# Patient Record
Sex: MALE | Race: WHITE | NOT HISPANIC OR LATINO | Employment: OTHER | URBAN - METROPOLITAN AREA
[De-identification: names, ages, dates, MRNs, and addresses within clinical notes are randomized per-mention and may not be internally consistent; named-entity substitution may affect disease eponyms.]

---

## 2021-01-28 ENCOUNTER — PREP FOR SURGERY (OUTPATIENT)
Dept: OTHER | Facility: HOSPITAL | Age: 81
End: 2021-01-28

## 2021-01-28 ENCOUNTER — TELEPHONE (OUTPATIENT)
Dept: CARDIAC SURGERY | Facility: CLINIC | Age: 81
End: 2021-01-28

## 2021-01-28 ENCOUNTER — HOSPITAL ENCOUNTER (OUTPATIENT)
Facility: HOSPITAL | Age: 81
Setting detail: SURGERY ADMIT
End: 2021-01-28
Attending: THORACIC SURGERY (CARDIOTHORACIC VASCULAR SURGERY) | Admitting: THORACIC SURGERY (CARDIOTHORACIC VASCULAR SURGERY)

## 2021-01-28 ENCOUNTER — APPOINTMENT (OUTPATIENT)
Dept: GENERAL RADIOLOGY | Facility: HOSPITAL | Age: 81
End: 2021-01-28

## 2021-01-28 ENCOUNTER — HOSPITAL ENCOUNTER (INPATIENT)
Facility: HOSPITAL | Age: 81
LOS: 7 days | Discharge: HOME | End: 2021-02-04
Attending: THORACIC SURGERY (CARDIOTHORACIC VASCULAR SURGERY) | Admitting: THORACIC SURGERY (CARDIOTHORACIC VASCULAR SURGERY)

## 2021-01-28 DIAGNOSIS — I25.118 CORONARY ARTERY DISEASE OF NATIVE ARTERY OF NATIVE HEART WITH STABLE ANGINA PECTORIS (HCC): Primary | ICD-10-CM

## 2021-01-28 DIAGNOSIS — I25.118 CORONARY ARTERY DISEASE OF NATIVE HEART WITH STABLE ANGINA PECTORIS, UNSPECIFIED VESSEL OR LESION TYPE (HCC): Primary | ICD-10-CM

## 2021-01-28 LAB
ARTERIAL PATENCY WRIST A: POSITIVE
ATMOSPHERIC PRESS: 761.4 MMHG
BASE EXCESS BLDA CALC-SCNC: -5.5 MMOL/L (ref 0–2)
BDY SITE: ABNORMAL
HCO3 BLDA-SCNC: 20.1 MMOL/L (ref 22–28)
INHALED O2 CONCENTRATION: 21 %
MODALITY: ABNORMAL
O2 A-A PPRESDIFF RESPIRATORY: 1 MMHG
PCO2 BLDA: 38.9 MM HG (ref 35–45)
PH BLDA: 7.32 PH UNITS (ref 7.35–7.45)
PO2 BLDA: 116.2 MM HG (ref 80–100)
SAO2 % BLDCOA: 98.2 % (ref 92–99)
TOTAL RATE: 18 BREATHS/MINUTE

## 2021-01-28 PROCEDURE — 86850 RBC ANTIBODY SCREEN: CPT | Performed by: NURSE PRACTITIONER

## 2021-01-28 PROCEDURE — 80061 LIPID PANEL: CPT | Performed by: NURSE PRACTITIONER

## 2021-01-28 PROCEDURE — 83036 HEMOGLOBIN GLYCOSYLATED A1C: CPT | Performed by: NURSE PRACTITIONER

## 2021-01-28 PROCEDURE — 86920 COMPATIBILITY TEST SPIN: CPT

## 2021-01-28 PROCEDURE — 86900 BLOOD TYPING SEROLOGIC ABO: CPT | Performed by: NURSE PRACTITIONER

## 2021-01-28 PROCEDURE — 85610 PROTHROMBIN TIME: CPT | Performed by: NURSE PRACTITIONER

## 2021-01-28 PROCEDURE — 86923 COMPATIBILITY TEST ELECTRIC: CPT

## 2021-01-28 PROCEDURE — 83735 ASSAY OF MAGNESIUM: CPT | Performed by: NURSE PRACTITIONER

## 2021-01-28 PROCEDURE — 80053 COMPREHEN METABOLIC PANEL: CPT | Performed by: NURSE PRACTITIONER

## 2021-01-28 PROCEDURE — 85730 THROMBOPLASTIN TIME PARTIAL: CPT | Performed by: NURSE PRACTITIONER

## 2021-01-28 PROCEDURE — 82803 BLOOD GASES ANY COMBINATION: CPT

## 2021-01-28 PROCEDURE — 36600 WITHDRAWAL OF ARTERIAL BLOOD: CPT

## 2021-01-28 PROCEDURE — 83880 ASSAY OF NATRIURETIC PEPTIDE: CPT | Performed by: NURSE PRACTITIONER

## 2021-01-28 PROCEDURE — 86901 BLOOD TYPING SEROLOGIC RH(D): CPT | Performed by: NURSE PRACTITIONER

## 2021-01-28 PROCEDURE — 71046 X-RAY EXAM CHEST 2 VIEWS: CPT

## 2021-01-28 RX ORDER — SODIUM CHLORIDE 0.9 % (FLUSH) 0.9 %
3 SYRINGE (ML) INJECTION EVERY 12 HOURS SCHEDULED
Status: DISCONTINUED | OUTPATIENT
Start: 2021-01-28 | End: 2021-01-30

## 2021-01-28 RX ORDER — SODIUM CHLORIDE 0.9 % (FLUSH) 0.9 %
10 SYRINGE (ML) INJECTION AS NEEDED
Status: DISCONTINUED | OUTPATIENT
Start: 2021-01-28 | End: 2021-01-30

## 2021-01-28 RX ORDER — CETIRIZINE HYDROCHLORIDE 5 MG/1
10 TABLET ORAL DAILY
COMMUNITY

## 2021-01-28 RX ORDER — TAMSULOSIN HYDROCHLORIDE 0.4 MG/1
1 CAPSULE ORAL DAILY
COMMUNITY

## 2021-01-28 RX ORDER — ACYCLOVIR 400 MG/1
400 TABLET ORAL 2 TIMES DAILY
COMMUNITY

## 2021-01-28 RX ORDER — CHLORHEXIDINE GLUCONATE 0.12 MG/ML
15 RINSE ORAL EVERY 12 HOURS SCHEDULED
Status: DISCONTINUED | OUTPATIENT
Start: 2021-01-28 | End: 2021-01-29

## 2021-01-28 RX ORDER — ASPIRIN 81 MG/1
81 TABLET, CHEWABLE ORAL DAILY
COMMUNITY
End: 2021-02-04 | Stop reason: HOSPADM

## 2021-01-28 RX ORDER — CEFAZOLIN SODIUM 2 G/100ML
2 INJECTION, SOLUTION INTRAVENOUS
Status: ACTIVE | OUTPATIENT
Start: 2021-01-29 | End: 2021-01-30

## 2021-01-28 RX ORDER — CHLORHEXIDINE GLUCONATE 500 MG/1
1 CLOTH TOPICAL EVERY 12 HOURS
Status: DISCONTINUED | OUTPATIENT
Start: 2021-01-28 | End: 2021-01-29

## 2021-01-28 RX ORDER — ERYTHROMYCIN 5 MG/G
1 OINTMENT OPHTHALMIC EVERY 6 HOURS
COMMUNITY

## 2021-01-28 RX ORDER — CHOLECALCIFEROL (VITAMIN D3) 125 MCG
500 CAPSULE ORAL DAILY
COMMUNITY

## 2021-01-28 RX ORDER — AMLODIPINE BESYLATE 5 MG/1
5 TABLET ORAL DAILY
Status: ON HOLD | COMMUNITY
End: 2021-01-29

## 2021-01-28 RX ORDER — HEPARIN SODIUM 10000 [USP'U]/100ML
500 INJECTION, SOLUTION INTRAVENOUS CONTINUOUS
Status: DISCONTINUED | OUTPATIENT
Start: 2021-01-29 | End: 2021-01-30

## 2021-01-28 RX ADMIN — SODIUM CHLORIDE, PRESERVATIVE FREE 3 ML: 5 INJECTION INTRAVENOUS at 23:48

## 2021-01-28 RX ADMIN — CHLORHEXIDINE GLUCONATE 1 APPLICATION: 500 CLOTH TOPICAL at 23:48

## 2021-01-29 ENCOUNTER — APPOINTMENT (OUTPATIENT)
Dept: CARDIOLOGY | Facility: HOSPITAL | Age: 81
End: 2021-01-29

## 2021-01-29 PROBLEM — I25.10 CAD (CORONARY ARTERY DISEASE): Status: ACTIVE | Noted: 2021-01-29

## 2021-01-29 LAB
ABO GROUP BLD: NORMAL
ACT BLD: 125 SECONDS (ref 82–152)
ACT BLD: 131 SECONDS (ref 82–152)
ACT BLD: 263 SECONDS (ref 82–152)
ALBUMIN SERPL-MCNC: 3.2 G/DL (ref 3.5–5.2)
ALBUMIN/GLOB SERPL: 0.5 G/DL
ALP SERPL-CCNC: 53 U/L (ref 39–117)
ALT SERPL W P-5'-P-CCNC: 18 U/L (ref 1–41)
ANION GAP SERPL CALCULATED.3IONS-SCNC: 11.5 MMOL/L (ref 5–15)
APTT PPP: 27 SECONDS (ref 22.7–35.4)
APTT PPP: <20 SECONDS (ref 22.7–35.4)
AST SERPL-CCNC: 19 U/L (ref 1–40)
BH CV XLRA MEAS - DIST GSV CALF DIST LEFT: 0.1 CM
BH CV XLRA MEAS - DIST GSV CALF DIST RIGHT: 0.33 CM
BH CV XLRA MEAS - DIST GSV THIGH DIST LEFT: 0.3 CM
BH CV XLRA MEAS - DIST GSV THIGH DIST RIGHT: 0.39 CM
BH CV XLRA MEAS - GSV ANKLE DIST LEFT: 0.16 CM
BH CV XLRA MEAS - GSV ANKLE DIST RIGHT: 0.29 CM
BH CV XLRA MEAS - GSV KNEE DIST LEFT: 0.25 CM
BH CV XLRA MEAS - GSV KNEE DIST RIGHT: 0.36 CM
BH CV XLRA MEAS - GSV ORIGIN DIST LEFT: 1.4 CM
BH CV XLRA MEAS - GSV ORIGIN DIST RIGHT: 0.81 CM
BH CV XLRA MEAS - MID GSV CALF LEFT: 0.14 CM
BH CV XLRA MEAS - MID GSV CALF RIGHT: 0.37 CM
BH CV XLRA MEAS - MID GSV THIGH  LEFT: 0.29 CM
BH CV XLRA MEAS - MID GSV THIGH  RIGHT: 0.41 CM
BH CV XLRA MEAS - PROX GSV CALF DIST LEFT: 0.24 CM
BH CV XLRA MEAS - PROX GSV CALF DIST RIGHT: 0.33 CM
BH CV XLRA MEAS - PROX GSV THIGH  LEFT: 0.28 CM
BH CV XLRA MEAS - PROX GSV THIGH  RIGHT: 0.38 CM
BH CV XLRA MEAS LEFT CCA RATIO VEL: -96.7 CM/SEC
BH CV XLRA MEAS LEFT DIST CCA EDV: -15.8 CM/SEC
BH CV XLRA MEAS LEFT DIST CCA PSV: -96.7 CM/SEC
BH CV XLRA MEAS LEFT DIST ICA EDV: -27.8 CM/SEC
BH CV XLRA MEAS LEFT DIST ICA PSV: -81.6 CM/SEC
BH CV XLRA MEAS LEFT ICA RATIO VEL: 112 CM/SEC
BH CV XLRA MEAS LEFT ICA/CCA RATIO: -1.2
BH CV XLRA MEAS LEFT MID ICA EDV: -23.2 CM/SEC
BH CV XLRA MEAS LEFT MID ICA PSV: -62.9 CM/SEC
BH CV XLRA MEAS LEFT PROX CCA EDV: -20.5 CM/SEC
BH CV XLRA MEAS LEFT PROX CCA PSV: -116 CM/SEC
BH CV XLRA MEAS LEFT PROX ECA EDV: 12.8 CM/SEC
BH CV XLRA MEAS LEFT PROX ECA PSV: 118 CM/SEC
BH CV XLRA MEAS LEFT PROX ICA EDV: 22 CM/SEC
BH CV XLRA MEAS LEFT PROX ICA PSV: 112 CM/SEC
BH CV XLRA MEAS LEFT PROX SCLA PSV: 238 CM/SEC
BH CV XLRA MEAS LEFT VERTEBRAL A EDV: -21.1 CM/SEC
BH CV XLRA MEAS LEFT VERTEBRAL A PSV: -78 CM/SEC
BH CV XLRA MEAS RIGHT CCA RATIO VEL: -99.8 CM/SEC
BH CV XLRA MEAS RIGHT DIST CCA EDV: -17.3 CM/SEC
BH CV XLRA MEAS RIGHT DIST CCA PSV: -99.8 CM/SEC
BH CV XLRA MEAS RIGHT DIST ICA EDV: -24.6 CM/SEC
BH CV XLRA MEAS RIGHT DIST ICA PSV: -65.5 CM/SEC
BH CV XLRA MEAS RIGHT ICA RATIO VEL: -135 CM/SEC
BH CV XLRA MEAS RIGHT ICA/CCA RATIO: 1.4
BH CV XLRA MEAS RIGHT MID ICA EDV: -31.4 CM/SEC
BH CV XLRA MEAS RIGHT MID ICA PSV: -102 CM/SEC
BH CV XLRA MEAS RIGHT PROX CCA EDV: 25.1 CM/SEC
BH CV XLRA MEAS RIGHT PROX CCA PSV: 140 CM/SEC
BH CV XLRA MEAS RIGHT PROX ECA EDV: -12.3 CM/SEC
BH CV XLRA MEAS RIGHT PROX ECA PSV: -176 CM/SEC
BH CV XLRA MEAS RIGHT PROX ICA EDV: -20.6 CM/SEC
BH CV XLRA MEAS RIGHT PROX ICA PSV: -135 CM/SEC
BH CV XLRA MEAS RIGHT PROX SCLA PSV: 165 CM/SEC
BH CV XLRA MEAS RIGHT VERTEBRAL A EDV: -9.5 CM/SEC
BH CV XLRA MEAS RIGHT VERTEBRAL A PSV: -25.7 CM/SEC
BILIRUB SERPL-MCNC: 0.3 MG/DL (ref 0–1.2)
BILIRUB UR QL STRIP: NEGATIVE
BLD GP AB SCN SERPL QL: NEGATIVE
BUN SERPL-MCNC: 33 MG/DL (ref 8–23)
BUN/CREAT SERPL: 20.6 (ref 7–25)
CALCIUM SPEC-SCNC: 8.6 MG/DL (ref 8.6–10.5)
CHLORIDE SERPL-SCNC: 104 MMOL/L (ref 98–107)
CHOLEST SERPL-MCNC: 141 MG/DL (ref 0–200)
CLARITY UR: CLEAR
CLOSE TME COLL+ADP + EPINEP PNL BLD: 41 %
CO2 SERPL-SCNC: 24.5 MMOL/L (ref 22–29)
COLOR UR: YELLOW
CREAT SERPL-MCNC: 1.6 MG/DL (ref 0.76–1.27)
DEPRECATED RDW RBC AUTO: 58.1 FL (ref 37–54)
ERYTHROCYTE [DISTWIDTH] IN BLOOD BY AUTOMATED COUNT: 17.1 % (ref 12.3–15.4)
FERRITIN SERPL-MCNC: 157 NG/ML (ref 30–400)
FOLATE SERPL-MCNC: 10.4 NG/ML (ref 4.78–24.2)
GFR SERPL CREATININE-BSD FRML MDRD: 42 ML/MIN/1.73
GLOBULIN UR ELPH-MCNC: 6.2 GM/DL
GLUCOSE BLDC GLUCOMTR-MCNC: 104 MG/DL (ref 70–130)
GLUCOSE BLDC GLUCOMTR-MCNC: 372 MG/DL (ref 70–130)
GLUCOSE BLDC GLUCOMTR-MCNC: 98 MG/DL (ref 70–130)
GLUCOSE SERPL-MCNC: 99 MG/DL (ref 65–99)
GLUCOSE UR STRIP-MCNC: NEGATIVE MG/DL
HBA1C MFR BLD: 6.3 % (ref 4.8–5.6)
HCT VFR BLD AUTO: 27.3 % (ref 37.5–51)
HDLC SERPL-MCNC: 35 MG/DL (ref 40–60)
HGB BLD-MCNC: 8.9 G/DL (ref 13–17.7)
HGB RETIC QN AUTO: 34.3 PG (ref 29.8–36.1)
HGB UR QL STRIP.AUTO: NEGATIVE
IMM RETICS NFR: 34.6 % (ref 3–15.8)
INR PPP: 1.08 (ref 0.9–1.1)
IRON 24H UR-MRATE: 49 MCG/DL (ref 59–158)
IRON SATN MFR SERPL: 20 % (ref 20–50)
KETONES UR QL STRIP: NEGATIVE
LDLC SERPL CALC-MCNC: 67 MG/DL (ref 0–100)
LDLC/HDLC SERPL: 1.66 {RATIO}
LEUKOCYTE ESTERASE UR QL STRIP.AUTO: NEGATIVE
LYMPHOCYTES # BLD MANUAL: 1.69 10*3/MM3 (ref 0.7–3.1)
LYMPHOCYTES NFR BLD MANUAL: 10 % (ref 5–12)
LYMPHOCYTES NFR BLD MANUAL: 18 % (ref 19.6–45.3)
MAGNESIUM SERPL-MCNC: 2.2 MG/DL (ref 1.6–2.4)
MCH RBC QN AUTO: 30.6 PG (ref 26.6–33)
MCHC RBC AUTO-ENTMCNC: 32.6 G/DL (ref 31.5–35.7)
MCV RBC AUTO: 93.8 FL (ref 79–97)
MONOCYTES # BLD AUTO: 0.94 10*3/MM3 (ref 0.1–0.9)
NEUTROPHILS # BLD AUTO: 6.77 10*3/MM3 (ref 1.7–7)
NEUTROPHILS NFR BLD MANUAL: 72 % (ref 42.7–76)
NITRITE UR QL STRIP: NEGATIVE
NRBC BLD AUTO-RTO: 2.8 /100 WBC (ref 0–0.2)
NRBC SPEC MANUAL: 6 /100 WBC (ref 0–0.2)
NT-PROBNP SERPL-MCNC: 1571 PG/ML (ref 0–1800)
PH UR STRIP.AUTO: 5.5 [PH] (ref 5–8)
PLAT MORPH BLD: NORMAL
PLATELET # BLD AUTO: 174 10*3/MM3 (ref 140–450)
PMV BLD AUTO: 11.1 FL (ref 6–12)
POTASSIUM SERPL-SCNC: 4 MMOL/L (ref 3.5–5.2)
PROT SERPL-MCNC: 9.4 G/DL (ref 6–8.5)
PROT UR QL STRIP: NEGATIVE
PROTHROMBIN TIME: 13.8 SECONDS (ref 11.7–14.2)
RBC # BLD AUTO: 2.91 10*6/MM3 (ref 4.14–5.8)
RBC MORPH BLD: NORMAL
RETICS # AUTO: 0.06 10*6/MM3 (ref 0.02–0.13)
RETICS/RBC NFR AUTO: 1.99 % (ref 0.7–1.9)
RH BLD: POSITIVE
RIGHT ARM BP: NORMAL MMHG
SARS-COV-2 ORF1AB RESP QL NAA+PROBE: NOT DETECTED
SODIUM SERPL-SCNC: 140 MMOL/L (ref 136–145)
SP GR UR STRIP: 1.02 (ref 1–1.03)
T&S EXPIRATION DATE: NORMAL
TIBC SERPL-MCNC: 244 MCG/DL (ref 298–536)
TRANSFERRIN SERPL-MCNC: 164 MG/DL (ref 200–360)
TRIGL SERPL-MCNC: 240 MG/DL (ref 0–150)
UROBILINOGEN UR QL STRIP: NORMAL
VIT B12 BLD-MCNC: 1016 PG/ML (ref 211–946)
VLDLC SERPL-MCNC: 39 MG/DL (ref 5–40)
WBC # BLD AUTO: 9.4 10*3/MM3 (ref 3.4–10.8)
WBC MORPH BLD: NORMAL

## 2021-01-29 PROCEDURE — 25010000002 MIDAZOLAM PER 1 MG: Performed by: INTERNAL MEDICINE

## 2021-01-29 PROCEDURE — 93880 EXTRACRANIAL BILAT STUDY: CPT

## 2021-01-29 PROCEDURE — 99153 MOD SED SAME PHYS/QHP EA: CPT | Performed by: INTERNAL MEDICINE

## 2021-01-29 PROCEDURE — C1725 CATH, TRANSLUMIN NON-LASER: HCPCS | Performed by: INTERNAL MEDICINE

## 2021-01-29 PROCEDURE — 85347 COAGULATION TIME ACTIVATED: CPT

## 2021-01-29 PROCEDURE — 25010000002 PHENYLEPHRINE PER 1 ML: Performed by: INTERNAL MEDICINE

## 2021-01-29 PROCEDURE — 0 IOPAMIDOL PER 1 ML: Performed by: INTERNAL MEDICINE

## 2021-01-29 PROCEDURE — 92920 PRQ TRLUML C ANGIOP 1ART&/BR: CPT | Performed by: INTERNAL MEDICINE

## 2021-01-29 PROCEDURE — 93970 EXTREMITY STUDY: CPT

## 2021-01-29 PROCEDURE — C1769 GUIDE WIRE: HCPCS | Performed by: INTERNAL MEDICINE

## 2021-01-29 PROCEDURE — 99222 1ST HOSP IP/OBS MODERATE 55: CPT | Performed by: INTERNAL MEDICINE

## 2021-01-29 PROCEDURE — 84466 ASSAY OF TRANSFERRIN: CPT | Performed by: INTERNAL MEDICINE

## 2021-01-29 PROCEDURE — C1887 CATHETER, GUIDING: HCPCS | Performed by: INTERNAL MEDICINE

## 2021-01-29 PROCEDURE — 86334 IMMUNOFIX E-PHORESIS SERUM: CPT | Performed by: INTERNAL MEDICINE

## 2021-01-29 PROCEDURE — 25010000002 FENTANYL CITRATE (PF) 100 MCG/2ML SOLUTION: Performed by: INTERNAL MEDICINE

## 2021-01-29 PROCEDURE — 82607 VITAMIN B-12: CPT | Performed by: INTERNAL MEDICINE

## 2021-01-29 PROCEDURE — 25010000002 HEPARIN (PORCINE) PER 1000 UNITS: Performed by: THORACIC SURGERY (CARDIOTHORACIC VASCULAR SURGERY)

## 2021-01-29 PROCEDURE — 0270356 DILATION OF CORONARY ARTERY, ONE ARTERY, BIFURCATION, WITH TWO DRUG-ELUTING INTRALUMINAL DEVICES, PERCUTANEOUS APPROACH: ICD-10-PCS | Performed by: INTERNAL MEDICINE

## 2021-01-29 PROCEDURE — 82746 ASSAY OF FOLIC ACID SERUM: CPT | Performed by: INTERNAL MEDICINE

## 2021-01-29 PROCEDURE — 85025 COMPLETE CBC W/AUTO DIFF WBC: CPT | Performed by: NURSE PRACTITIONER

## 2021-01-29 PROCEDURE — 86900 BLOOD TYPING SEROLOGIC ABO: CPT

## 2021-01-29 PROCEDURE — C1874 STENT, COATED/COV W/DEL SYS: HCPCS | Performed by: INTERNAL MEDICINE

## 2021-01-29 PROCEDURE — 85730 THROMBOPLASTIN TIME PARTIAL: CPT | Performed by: THORACIC SURGERY (CARDIOTHORACIC VASCULAR SURGERY)

## 2021-01-29 PROCEDURE — 25010000002 PROTAMINE SULFATE PER 10 MG: Performed by: INTERNAL MEDICINE

## 2021-01-29 PROCEDURE — 83540 ASSAY OF IRON: CPT | Performed by: INTERNAL MEDICINE

## 2021-01-29 PROCEDURE — 84165 PROTEIN E-PHORESIS SERUM: CPT | Performed by: INTERNAL MEDICINE

## 2021-01-29 PROCEDURE — 92928 PRQ TCAT PLMT NTRAC ST 1 LES: CPT | Performed by: INTERNAL MEDICINE

## 2021-01-29 PROCEDURE — C1894 INTRO/SHEATH, NON-LASER: HCPCS | Performed by: INTERNAL MEDICINE

## 2021-01-29 PROCEDURE — 85007 BL SMEAR W/DIFF WBC COUNT: CPT | Performed by: NURSE PRACTITIONER

## 2021-01-29 PROCEDURE — 84155 ASSAY OF PROTEIN SERUM: CPT | Performed by: INTERNAL MEDICINE

## 2021-01-29 PROCEDURE — 82784 ASSAY IGA/IGD/IGG/IGM EACH: CPT | Performed by: INTERNAL MEDICINE

## 2021-01-29 PROCEDURE — 82962 GLUCOSE BLOOD TEST: CPT

## 2021-01-29 PROCEDURE — 85576 BLOOD PLATELET AGGREGATION: CPT | Performed by: NURSE PRACTITIONER

## 2021-01-29 PROCEDURE — 83883 ASSAY NEPHELOMETRY NOT SPEC: CPT | Performed by: INTERNAL MEDICINE

## 2021-01-29 PROCEDURE — 93005 ELECTROCARDIOGRAM TRACING: CPT | Performed by: INTERNAL MEDICINE

## 2021-01-29 PROCEDURE — 99223 1ST HOSP IP/OBS HIGH 75: CPT | Performed by: THORACIC SURGERY (CARDIOTHORACIC VASCULAR SURGERY)

## 2021-01-29 PROCEDURE — 81003 URINALYSIS AUTO W/O SCOPE: CPT | Performed by: THORACIC SURGERY (CARDIOTHORACIC VASCULAR SURGERY)

## 2021-01-29 PROCEDURE — 82728 ASSAY OF FERRITIN: CPT | Performed by: INTERNAL MEDICINE

## 2021-01-29 PROCEDURE — 86901 BLOOD TYPING SEROLOGIC RH(D): CPT

## 2021-01-29 PROCEDURE — 25010000002 HEPARIN (PORCINE) PER 1000 UNITS: Performed by: INTERNAL MEDICINE

## 2021-01-29 PROCEDURE — C9600 PERC DRUG-EL COR STENT SING: HCPCS | Performed by: INTERNAL MEDICINE

## 2021-01-29 PROCEDURE — 02703ZZ DILATION OF CORONARY ARTERY, ONE ARTERY, PERCUTANEOUS APPROACH: ICD-10-PCS | Performed by: INTERNAL MEDICINE

## 2021-01-29 PROCEDURE — 85046 RETICYTE/HGB CONCENTRATE: CPT | Performed by: INTERNAL MEDICINE

## 2021-01-29 PROCEDURE — U0004 COV-19 TEST NON-CDC HGH THRU: HCPCS | Performed by: THORACIC SURGERY (CARDIOTHORACIC VASCULAR SURGERY)

## 2021-01-29 PROCEDURE — 99152 MOD SED SAME PHYS/QHP 5/>YRS: CPT | Performed by: INTERNAL MEDICINE

## 2021-01-29 DEVICE — XIENCE SIERRA™ EVEROLIMUS ELUTING CORONARY STENT SYSTEM 3.50 MM X 33 MM / RAPID-EXCHANGE
Type: IMPLANTABLE DEVICE | Status: FUNCTIONAL
Brand: XIENCE SIERRA™

## 2021-01-29 DEVICE — XIENCE SIERRA™ EVEROLIMUS ELUTING CORONARY STENT SYSTEM 3.50 MM X 15 MM / RAPID-EXCHANGE
Type: IMPLANTABLE DEVICE | Status: FUNCTIONAL
Brand: XIENCE SIERRA™

## 2021-01-29 RX ORDER — LIDOCAINE HYDROCHLORIDE 20 MG/ML
INJECTION, SOLUTION INFILTRATION; PERINEURAL AS NEEDED
Status: DISCONTINUED | OUTPATIENT
Start: 2021-01-29 | End: 2021-01-29 | Stop reason: HOSPADM

## 2021-01-29 RX ORDER — ATORVASTATIN CALCIUM 20 MG/1
10 TABLET, FILM COATED ORAL NIGHTLY
Status: DISCONTINUED | OUTPATIENT
Start: 2021-01-29 | End: 2021-02-04 | Stop reason: HOSPADM

## 2021-01-29 RX ORDER — MORPHINE SULFATE 2 MG/ML
1 INJECTION, SOLUTION INTRAMUSCULAR; INTRAVENOUS EVERY 4 HOURS PRN
Status: DISCONTINUED | OUTPATIENT
Start: 2021-01-29 | End: 2021-02-04 | Stop reason: HOSPADM

## 2021-01-29 RX ORDER — TEMAZEPAM 15 MG/1
15 CAPSULE ORAL NIGHTLY PRN
Status: DISCONTINUED | OUTPATIENT
Start: 2021-01-29 | End: 2021-02-04 | Stop reason: HOSPADM

## 2021-01-29 RX ORDER — NALOXONE HCL 0.4 MG/ML
0.4 VIAL (ML) INJECTION
Status: DISCONTINUED | OUTPATIENT
Start: 2021-01-29 | End: 2021-02-04 | Stop reason: HOSPADM

## 2021-01-29 RX ORDER — MIDAZOLAM HYDROCHLORIDE 1 MG/ML
INJECTION INTRAMUSCULAR; INTRAVENOUS AS NEEDED
Status: DISCONTINUED | OUTPATIENT
Start: 2021-01-29 | End: 2021-01-29 | Stop reason: HOSPADM

## 2021-01-29 RX ORDER — SODIUM CHLORIDE 9 MG/ML
50 INJECTION, SOLUTION INTRAVENOUS CONTINUOUS
Status: ACTIVE | OUTPATIENT
Start: 2021-01-29 | End: 2021-01-30

## 2021-01-29 RX ORDER — ACETAMINOPHEN 325 MG/1
650 TABLET ORAL EVERY 4 HOURS PRN
Status: DISCONTINUED | OUTPATIENT
Start: 2021-01-29 | End: 2021-02-04 | Stop reason: HOSPADM

## 2021-01-29 RX ORDER — HEPARIN SODIUM 1000 [USP'U]/ML
INJECTION, SOLUTION INTRAVENOUS; SUBCUTANEOUS AS NEEDED
Status: DISCONTINUED | OUTPATIENT
Start: 2021-01-29 | End: 2021-01-29 | Stop reason: HOSPADM

## 2021-01-29 RX ORDER — ISOSORBIDE MONONITRATE 60 MG/1
60 TABLET, EXTENDED RELEASE ORAL
Status: DISCONTINUED | OUTPATIENT
Start: 2021-01-29 | End: 2021-02-04 | Stop reason: HOSPADM

## 2021-01-29 RX ORDER — CLOPIDOGREL BISULFATE 75 MG/1
TABLET ORAL AS NEEDED
Status: DISCONTINUED | OUTPATIENT
Start: 2021-01-29 | End: 2021-01-29 | Stop reason: HOSPADM

## 2021-01-29 RX ORDER — ONDANSETRON 2 MG/ML
4 INJECTION INTRAMUSCULAR; INTRAVENOUS EVERY 6 HOURS PRN
Status: DISCONTINUED | OUTPATIENT
Start: 2021-01-29 | End: 2021-02-04 | Stop reason: HOSPADM

## 2021-01-29 RX ORDER — HYDROCODONE BITARTRATE AND ACETAMINOPHEN 5; 325 MG/1; MG/1
1 TABLET ORAL EVERY 4 HOURS PRN
Status: DISCONTINUED | OUTPATIENT
Start: 2021-01-29 | End: 2021-02-04 | Stop reason: HOSPADM

## 2021-01-29 RX ORDER — ASPIRIN 81 MG/1
81 TABLET, CHEWABLE ORAL DAILY
Status: DISCONTINUED | OUTPATIENT
Start: 2021-01-29 | End: 2021-02-01

## 2021-01-29 RX ORDER — ONDANSETRON 4 MG/1
4 TABLET, FILM COATED ORAL EVERY 6 HOURS PRN
Status: DISCONTINUED | OUTPATIENT
Start: 2021-01-29 | End: 2021-02-04 | Stop reason: HOSPADM

## 2021-01-29 RX ORDER — PROTAMINE SULFATE 10 MG/ML
INJECTION, SOLUTION INTRAVENOUS AS NEEDED
Status: DISCONTINUED | OUTPATIENT
Start: 2021-01-29 | End: 2021-01-29 | Stop reason: HOSPADM

## 2021-01-29 RX ORDER — FENTANYL CITRATE 50 UG/ML
INJECTION, SOLUTION INTRAMUSCULAR; INTRAVENOUS AS NEEDED
Status: DISCONTINUED | OUTPATIENT
Start: 2021-01-29 | End: 2021-01-29 | Stop reason: HOSPADM

## 2021-01-29 RX ORDER — CLOPIDOGREL BISULFATE 75 MG/1
75 TABLET ORAL DAILY
Status: DISCONTINUED | OUTPATIENT
Start: 2021-01-30 | End: 2021-02-04 | Stop reason: HOSPADM

## 2021-01-29 RX ADMIN — SODIUM CHLORIDE, PRESERVATIVE FREE 3 ML: 5 INJECTION INTRAVENOUS at 20:09

## 2021-01-29 RX ADMIN — METOPROLOL TARTRATE 12.5 MG: 25 TABLET, FILM COATED ORAL at 11:49

## 2021-01-29 RX ADMIN — SODIUM CHLORIDE 50 ML/HR: 9 INJECTION, SOLUTION INTRAVENOUS at 18:20

## 2021-01-29 RX ADMIN — ATORVASTATIN CALCIUM 10 MG: 20 TABLET, FILM COATED ORAL at 20:08

## 2021-01-29 RX ADMIN — METOPROLOL TARTRATE 12.5 MG: 25 TABLET, FILM COATED ORAL at 20:08

## 2021-01-29 RX ADMIN — ISOSORBIDE MONONITRATE 60 MG: 60 TABLET ORAL at 11:47

## 2021-01-29 RX ADMIN — ASPIRIN 81 MG: 81 TABLET, CHEWABLE ORAL at 11:48

## 2021-01-29 RX ADMIN — HEPARIN SODIUM 500 UNITS/HR: 10000 INJECTION, SOLUTION INTRAVENOUS at 00:27

## 2021-01-30 ENCOUNTER — APPOINTMENT (OUTPATIENT)
Dept: CARDIOLOGY | Facility: HOSPITAL | Age: 81
End: 2021-01-30

## 2021-01-30 LAB
ALBUMIN SERPL-MCNC: 2.7 G/DL (ref 3.5–5.2)
ANION GAP SERPL CALCULATED.3IONS-SCNC: 10 MMOL/L (ref 5–15)
BH CV ECHO MEAS - BSA(HAYCOCK): 2.1 M^2
BH CV ECHO MEAS - BSA: 2 M^2
BH CV ECHO MEAS - BZI_BMI: 32.4 KILOGRAMS/M^2
BH CV ECHO MEAS - BZI_METRIC_HEIGHT: 167.6 CM
BH CV ECHO MEAS - BZI_METRIC_WEIGHT: 91.2 KG
BH CV ECHO MEAS - EDV(MOD-SP2): 65 ML
BH CV ECHO MEAS - EDV(MOD-SP4): 54 ML
BH CV ECHO MEAS - EF(MOD-BP): 67.3 %
BH CV ECHO MEAS - EF(MOD-SP2): 69.2 %
BH CV ECHO MEAS - EF(MOD-SP4): 64.8 %
BH CV ECHO MEAS - ESV(MOD-SP2): 20 ML
BH CV ECHO MEAS - ESV(MOD-SP4): 19 ML
BH CV ECHO MEAS - LV DIASTOLIC VOL/BSA (35-75): 26.9 ML/M^2
BH CV ECHO MEAS - LV SYSTOLIC VOL/BSA (12-30): 9.5 ML/M^2
BH CV ECHO MEAS - LVLD AP2: 8.5 CM
BH CV ECHO MEAS - LVLD AP4: 7.6 CM
BH CV ECHO MEAS - LVLS AP2: 6.6 CM
BH CV ECHO MEAS - LVLS AP4: 6.3 CM
BH CV ECHO MEAS - SI(MOD-SP2): 22.5 ML/M^2
BH CV ECHO MEAS - SI(MOD-SP4): 17.5 ML/M^2
BH CV ECHO MEAS - SV(MOD-SP2): 45 ML
BH CV ECHO MEAS - SV(MOD-SP4): 35 ML
BUN SERPL-MCNC: 27 MG/DL (ref 8–23)
BUN/CREAT SERPL: 17 (ref 7–25)
CALCIUM SPEC-SCNC: 8.3 MG/DL (ref 8.6–10.5)
CHLORIDE SERPL-SCNC: 105 MMOL/L (ref 98–107)
CHOLEST SERPL-MCNC: 111 MG/DL (ref 0–200)
CO2 SERPL-SCNC: 23 MMOL/L (ref 22–29)
CREAT SERPL-MCNC: 1.59 MG/DL (ref 0.76–1.27)
CREAT UR-MCNC: 163 MG/DL
DEPRECATED RDW RBC AUTO: 57.4 FL (ref 37–54)
ERYTHROCYTE [DISTWIDTH] IN BLOOD BY AUTOMATED COUNT: 16.5 % (ref 12.3–15.4)
GFR SERPL CREATININE-BSD FRML MDRD: 42 ML/MIN/1.73
GLUCOSE BLDC GLUCOMTR-MCNC: 100 MG/DL (ref 70–130)
GLUCOSE BLDC GLUCOMTR-MCNC: 109 MG/DL (ref 70–130)
GLUCOSE SERPL-MCNC: 105 MG/DL (ref 65–99)
HCT VFR BLD AUTO: 23.8 % (ref 37.5–51)
HDLC SERPL-MCNC: 27 MG/DL (ref 40–60)
HGB BLD-MCNC: 8 G/DL (ref 13–17.7)
LDLC SERPL CALC-MCNC: 45 MG/DL (ref 0–100)
LDLC/HDLC SERPL: 1.3 {RATIO}
MAXIMAL PREDICTED HEART RATE: 139 BPM
MCH RBC QN AUTO: 32 PG (ref 26.6–33)
MCHC RBC AUTO-ENTMCNC: 33.6 G/DL (ref 31.5–35.7)
MCV RBC AUTO: 95.2 FL (ref 79–97)
PHOSPHATE SERPL-MCNC: 3.4 MG/DL (ref 2.5–4.5)
PLATELET # BLD AUTO: 147 10*3/MM3 (ref 140–450)
PMV BLD AUTO: 11.4 FL (ref 6–12)
POTASSIUM SERPL-SCNC: 3.6 MMOL/L (ref 3.5–5.2)
PROT UR-MCNC: 20 MG/DL
PROT/CREAT UR: 122.7 MG/G CREA (ref 0–200)
RBC # BLD AUTO: 2.5 10*6/MM3 (ref 4.14–5.8)
SODIUM SERPL-SCNC: 138 MMOL/L (ref 136–145)
STRESS TARGET HR: 118 BPM
TRIGL SERPL-MCNC: 244 MG/DL (ref 0–150)
VLDLC SERPL-MCNC: 39 MG/DL (ref 5–40)
WBC # BLD AUTO: 6.13 10*3/MM3 (ref 3.4–10.8)

## 2021-01-30 PROCEDURE — 99222 1ST HOSP IP/OBS MODERATE 55: CPT | Performed by: INTERNAL MEDICINE

## 2021-01-30 PROCEDURE — 80061 LIPID PANEL: CPT | Performed by: INTERNAL MEDICINE

## 2021-01-30 PROCEDURE — 82570 ASSAY OF URINE CREATININE: CPT | Performed by: INTERNAL MEDICINE

## 2021-01-30 PROCEDURE — 25010000002 ENOXAPARIN PER 10 MG: Performed by: THORACIC SURGERY (CARDIOTHORACIC VASCULAR SURGERY)

## 2021-01-30 PROCEDURE — 93010 ELECTROCARDIOGRAM REPORT: CPT | Performed by: INTERNAL MEDICINE

## 2021-01-30 PROCEDURE — 99232 SBSQ HOSP IP/OBS MODERATE 35: CPT | Performed by: INTERNAL MEDICINE

## 2021-01-30 PROCEDURE — 93308 TTE F-UP OR LMTD: CPT

## 2021-01-30 PROCEDURE — 80069 RENAL FUNCTION PANEL: CPT | Performed by: INTERNAL MEDICINE

## 2021-01-30 PROCEDURE — 82962 GLUCOSE BLOOD TEST: CPT

## 2021-01-30 PROCEDURE — 84156 ASSAY OF PROTEIN URINE: CPT | Performed by: INTERNAL MEDICINE

## 2021-01-30 PROCEDURE — 93005 ELECTROCARDIOGRAM TRACING: CPT | Performed by: THORACIC SURGERY (CARDIOTHORACIC VASCULAR SURGERY)

## 2021-01-30 PROCEDURE — 85027 COMPLETE CBC AUTOMATED: CPT | Performed by: INTERNAL MEDICINE

## 2021-01-30 PROCEDURE — 93308 TTE F-UP OR LMTD: CPT | Performed by: INTERNAL MEDICINE

## 2021-01-30 RX ADMIN — METOPROLOL TARTRATE 12.5 MG: 25 TABLET, FILM COATED ORAL at 20:43

## 2021-01-30 RX ADMIN — ISOSORBIDE MONONITRATE 60 MG: 60 TABLET ORAL at 08:00

## 2021-01-30 RX ADMIN — ASPIRIN 81 MG: 81 TABLET, CHEWABLE ORAL at 08:00

## 2021-01-30 RX ADMIN — CLOPIDOGREL 75 MG: 75 TABLET, FILM COATED ORAL at 08:00

## 2021-01-30 RX ADMIN — ATORVASTATIN CALCIUM 10 MG: 20 TABLET, FILM COATED ORAL at 20:43

## 2021-01-30 RX ADMIN — ENOXAPARIN SODIUM 40 MG: 40 INJECTION SUBCUTANEOUS at 11:15

## 2021-01-30 RX ADMIN — METOPROLOL TARTRATE 12.5 MG: 25 TABLET, FILM COATED ORAL at 08:00

## 2021-01-31 ENCOUNTER — APPOINTMENT (OUTPATIENT)
Dept: GENERAL RADIOLOGY | Facility: HOSPITAL | Age: 81
End: 2021-01-31

## 2021-01-31 ENCOUNTER — APPOINTMENT (OUTPATIENT)
Dept: CARDIOLOGY | Facility: HOSPITAL | Age: 81
End: 2021-01-31

## 2021-01-31 PROBLEM — J45.909 ASTHMA: Status: ACTIVE | Noted: 2019-03-27

## 2021-01-31 PROBLEM — E78.00 HYPERCHOLESTEROLEMIA: Status: ACTIVE | Noted: 2019-03-27

## 2021-01-31 PROBLEM — I10 HYPERTENSIVE DISORDER: Status: ACTIVE | Noted: 2019-03-27

## 2021-01-31 PROBLEM — L84 FOOT CALLUS: Status: ACTIVE | Noted: 2020-02-27

## 2021-01-31 LAB
ABO GROUP BLD: NORMAL
ALBUMIN SERPL-MCNC: 3 G/DL (ref 3.5–5.2)
ANION GAP SERPL CALCULATED.3IONS-SCNC: 12.6 MMOL/L (ref 5–15)
APTT PPP: 26.9 SECONDS (ref 22.7–35.4)
BASOPHILS # BLD AUTO: 0.02 10*3/MM3 (ref 0–0.2)
BASOPHILS # BLD AUTO: 0.03 10*3/MM3 (ref 0–0.2)
BASOPHILS NFR BLD AUTO: 0.2 % (ref 0–1.5)
BASOPHILS NFR BLD AUTO: 0.5 % (ref 0–1.5)
BH CV LOW VAS LEFT COMMON FEMORAL SPONT: 1
BH CV LOW VAS LEFT DISTAL FEMORAL SPONT: 1
BH CV LOW VAS LEFT EXTERNAL ILIAC AUGMENT: NORMAL
BH CV LOW VAS LEFT EXTERNAL ILIAC COMPRESS: NORMAL
BH CV LOW VAS LEFT EXTERNAL ILIAC SPONT: 1
BH CV LOW VAS LEFT EXTERNAL ILIAC THROMBUS: NORMAL
BH CV LOW VAS LEFT GASTRONEMIUS VESSEL: 1
BH CV LOW VAS LEFT GREATER SAPH AK VESSEL: 1
BH CV LOW VAS LEFT MID FEMORAL SPONT: 1
BH CV LOW VAS LEFT PERONEAL VESSEL: 1
BH CV LOW VAS LEFT POPLITEAL SPONT: 1
BH CV LOW VAS LEFT POSTERIOR TIBIAL VESSEL: 1
BH CV LOW VAS LEFT PROFUNDA FEMORAL SPONT: 1
BH CV LOW VAS LEFT PROXIMAL FEMORAL SPONT: 1
BH CV LOW VAS LEFT SAPHENOFEMORAL JUNCTION SPONT: 1
BH CV LOW VAS LEFT SOLEAL VESSEL: 1
BH CV LOWER VASCULAR LEFT COMMON FEMORAL AUGMENT: NORMAL
BH CV LOWER VASCULAR LEFT COMMON FEMORAL COMPRESS: NORMAL
BH CV LOWER VASCULAR LEFT COMMON FEMORAL PHASIC: NORMAL
BH CV LOWER VASCULAR LEFT COMMON FEMORAL SPONT: NORMAL
BH CV LOWER VASCULAR LEFT COMMON FEMORAL THROMBUS: NORMAL
BH CV LOWER VASCULAR LEFT DISTAL FEMORAL COMPRESS: NORMAL
BH CV LOWER VASCULAR LEFT DISTAL FEMORAL THROMBUS: NORMAL
BH CV LOWER VASCULAR LEFT EXTERNAL ILIAC PHASIC: NORMAL
BH CV LOWER VASCULAR LEFT EXTERNAL ILIAC SPONT: NORMAL
BH CV LOWER VASCULAR LEFT GASTRONEMIUS COMPRESS: NORMAL
BH CV LOWER VASCULAR LEFT GASTRONEMIUS THROMBUS: NORMAL
BH CV LOWER VASCULAR LEFT GREATER SAPH AK COMPRESS: NORMAL
BH CV LOWER VASCULAR LEFT GREATER SAPH AK THROMBUS: NORMAL
BH CV LOWER VASCULAR LEFT GREATER SAPH BK COMPRESS: NORMAL
BH CV LOWER VASCULAR LEFT LESSER SAPH COMPRESS: NORMAL
BH CV LOWER VASCULAR LEFT MID FEMORAL AUGMENT: NORMAL
BH CV LOWER VASCULAR LEFT MID FEMORAL COMPRESS: NORMAL
BH CV LOWER VASCULAR LEFT MID FEMORAL PHASIC: NORMAL
BH CV LOWER VASCULAR LEFT MID FEMORAL SPONT: NORMAL
BH CV LOWER VASCULAR LEFT MID FEMORAL THROMBUS: NORMAL
BH CV LOWER VASCULAR LEFT PERONEAL COMPRESS: NORMAL
BH CV LOWER VASCULAR LEFT PERONEAL THROMBUS: NORMAL
BH CV LOWER VASCULAR LEFT POPLITEAL AUGMENT: NORMAL
BH CV LOWER VASCULAR LEFT POPLITEAL COMPRESS: NORMAL
BH CV LOWER VASCULAR LEFT POPLITEAL PHASIC: NORMAL
BH CV LOWER VASCULAR LEFT POPLITEAL SPONT: NORMAL
BH CV LOWER VASCULAR LEFT POPLITEAL THROMBUS: NORMAL
BH CV LOWER VASCULAR LEFT POSTERIOR TIBIAL COMPRESS: NORMAL
BH CV LOWER VASCULAR LEFT POSTERIOR TIBIAL THROMBUS: NORMAL
BH CV LOWER VASCULAR LEFT PROFUNDA FEMORAL COMPRESS: NORMAL
BH CV LOWER VASCULAR LEFT PROFUNDA FEMORAL THROMBUS: NORMAL
BH CV LOWER VASCULAR LEFT PROXIMAL FEMORAL COMPRESS: NORMAL
BH CV LOWER VASCULAR LEFT PROXIMAL FEMORAL THROMBUS: NORMAL
BH CV LOWER VASCULAR LEFT SAPHENOFEMORAL JUNCTION COMPRESS: NORMAL
BH CV LOWER VASCULAR LEFT SAPHENOFEMORAL JUNCTION THROMBUS: NORMAL
BH CV LOWER VASCULAR LEFT SOLEAL COMPRESS: NORMAL
BH CV LOWER VASCULAR LEFT SOLEAL THROMBUS: NORMAL
BH CV LOWER VASCULAR RIGHT COMMON FEMORAL AUGMENT: NORMAL
BH CV LOWER VASCULAR RIGHT COMMON FEMORAL COMPETENT: NORMAL
BH CV LOWER VASCULAR RIGHT COMMON FEMORAL COMPRESS: NORMAL
BH CV LOWER VASCULAR RIGHT COMMON FEMORAL PHASIC: NORMAL
BH CV LOWER VASCULAR RIGHT COMMON FEMORAL SPONT: NORMAL
BLD GP AB SCN SERPL QL: NEGATIVE
BUN SERPL-MCNC: 26 MG/DL (ref 8–23)
BUN/CREAT SERPL: 18.1 (ref 7–25)
CALCIUM SPEC-SCNC: 8.7 MG/DL (ref 8.6–10.5)
CHLORIDE SERPL-SCNC: 103 MMOL/L (ref 98–107)
CO2 SERPL-SCNC: 21.4 MMOL/L (ref 22–29)
CREAT SERPL-MCNC: 1.44 MG/DL (ref 0.76–1.27)
DEPRECATED RDW RBC AUTO: 55.9 FL (ref 37–54)
DEPRECATED RDW RBC AUTO: 56.5 FL (ref 37–54)
EOSINOPHIL # BLD AUTO: 0.37 10*3/MM3 (ref 0–0.4)
EOSINOPHIL # BLD AUTO: 0.42 10*3/MM3 (ref 0–0.4)
EOSINOPHIL NFR BLD AUTO: 4.1 % (ref 0.3–6.2)
EOSINOPHIL NFR BLD AUTO: 6.6 % (ref 0.3–6.2)
ERYTHROCYTE [DISTWIDTH] IN BLOOD BY AUTOMATED COUNT: 16.8 % (ref 12.3–15.4)
ERYTHROCYTE [DISTWIDTH] IN BLOOD BY AUTOMATED COUNT: 17 % (ref 12.3–15.4)
GFR SERPL CREATININE-BSD FRML MDRD: 47 ML/MIN/1.73
GLUCOSE SERPL-MCNC: 94 MG/DL (ref 65–99)
HCT VFR BLD AUTO: 25.1 % (ref 37.5–51)
HCT VFR BLD AUTO: 25.7 % (ref 37.5–51)
HGB BLD-MCNC: 8.4 G/DL (ref 13–17.7)
HGB BLD-MCNC: 8.8 G/DL (ref 13–17.7)
IMM GRANULOCYTES # BLD AUTO: 0.06 10*3/MM3 (ref 0–0.05)
IMM GRANULOCYTES # BLD AUTO: 0.1 10*3/MM3 (ref 0–0.05)
IMM GRANULOCYTES NFR BLD AUTO: 0.9 % (ref 0–0.5)
IMM GRANULOCYTES NFR BLD AUTO: 1.1 % (ref 0–0.5)
INR PPP: 1.28 (ref 0.9–1.1)
LYMPHOCYTES # BLD AUTO: 2.09 10*3/MM3 (ref 0.7–3.1)
LYMPHOCYTES # BLD AUTO: 2.34 10*3/MM3 (ref 0.7–3.1)
LYMPHOCYTES NFR BLD AUTO: 25.8 % (ref 19.6–45.3)
LYMPHOCYTES NFR BLD AUTO: 32.9 % (ref 19.6–45.3)
MAGNESIUM SERPL-MCNC: 2.5 MG/DL (ref 1.6–2.4)
MCH RBC QN AUTO: 31.5 PG (ref 26.6–33)
MCH RBC QN AUTO: 32 PG (ref 26.6–33)
MCHC RBC AUTO-ENTMCNC: 33.5 G/DL (ref 31.5–35.7)
MCHC RBC AUTO-ENTMCNC: 34.2 G/DL (ref 31.5–35.7)
MCV RBC AUTO: 93.5 FL (ref 79–97)
MCV RBC AUTO: 94 FL (ref 79–97)
MONOCYTES # BLD AUTO: 0.86 10*3/MM3 (ref 0.1–0.9)
MONOCYTES # BLD AUTO: 0.91 10*3/MM3 (ref 0.1–0.9)
MONOCYTES NFR BLD AUTO: 10 % (ref 5–12)
MONOCYTES NFR BLD AUTO: 13.5 % (ref 5–12)
NEUTROPHILS NFR BLD AUTO: 2.9 10*3/MM3 (ref 1.7–7)
NEUTROPHILS NFR BLD AUTO: 45.6 % (ref 42.7–76)
NEUTROPHILS NFR BLD AUTO: 5.34 10*3/MM3 (ref 1.7–7)
NEUTROPHILS NFR BLD AUTO: 58.8 % (ref 42.7–76)
NRBC BLD AUTO-RTO: 0.2 /100 WBC (ref 0–0.2)
NRBC BLD AUTO-RTO: 0.5 /100 WBC (ref 0–0.2)
PHOSPHATE SERPL-MCNC: 3.5 MG/DL (ref 2.5–4.5)
PLATELET # BLD AUTO: 131 10*3/MM3 (ref 140–450)
PLATELET # BLD AUTO: 134 10*3/MM3 (ref 140–450)
PMV BLD AUTO: 11.4 FL (ref 6–12)
PMV BLD AUTO: 11.5 FL (ref 6–12)
POTASSIUM SERPL-SCNC: 3.8 MMOL/L (ref 3.5–5.2)
PROTHROMBIN TIME: 15.8 SECONDS (ref 11.7–14.2)
QT INTERVAL: 415 MS
QT INTERVAL: 432 MS
RBC # BLD AUTO: 2.67 10*6/MM3 (ref 4.14–5.8)
RBC # BLD AUTO: 2.75 10*6/MM3 (ref 4.14–5.8)
RH BLD: POSITIVE
SODIUM SERPL-SCNC: 137 MMOL/L (ref 136–145)
T&S EXPIRATION DATE: NORMAL
URATE SERPL-MCNC: 8.1 MG/DL (ref 3.4–7)
WBC # BLD AUTO: 6.36 10*3/MM3 (ref 3.4–10.8)
WBC # BLD AUTO: 9.08 10*3/MM3 (ref 3.4–10.8)

## 2021-01-31 PROCEDURE — 25010000002 ONDANSETRON PER 1 MG: Performed by: THORACIC SURGERY (CARDIOTHORACIC VASCULAR SURGERY)

## 2021-01-31 PROCEDURE — 99232 SBSQ HOSP IP/OBS MODERATE 35: CPT | Performed by: NURSE PRACTITIONER

## 2021-01-31 PROCEDURE — 74018 RADEX ABDOMEN 1 VIEW: CPT

## 2021-01-31 PROCEDURE — 25010000002 MORPHINE PER 10 MG: Performed by: THORACIC SURGERY (CARDIOTHORACIC VASCULAR SURGERY)

## 2021-01-31 PROCEDURE — 80069 RENAL FUNCTION PANEL: CPT | Performed by: INTERNAL MEDICINE

## 2021-01-31 PROCEDURE — 86900 BLOOD TYPING SEROLOGIC ABO: CPT | Performed by: SURGERY

## 2021-01-31 PROCEDURE — 85025 COMPLETE CBC W/AUTO DIFF WBC: CPT | Performed by: INTERNAL MEDICINE

## 2021-01-31 PROCEDURE — 83735 ASSAY OF MAGNESIUM: CPT | Performed by: INTERNAL MEDICINE

## 2021-01-31 PROCEDURE — 85730 THROMBOPLASTIN TIME PARTIAL: CPT | Performed by: THORACIC SURGERY (CARDIOTHORACIC VASCULAR SURGERY)

## 2021-01-31 PROCEDURE — 86850 RBC ANTIBODY SCREEN: CPT | Performed by: SURGERY

## 2021-01-31 PROCEDURE — 84550 ASSAY OF BLOOD/URIC ACID: CPT | Performed by: INTERNAL MEDICINE

## 2021-01-31 PROCEDURE — 99232 SBSQ HOSP IP/OBS MODERATE 35: CPT | Performed by: INTERNAL MEDICINE

## 2021-01-31 PROCEDURE — 86901 BLOOD TYPING SEROLOGIC RH(D): CPT | Performed by: SURGERY

## 2021-01-31 PROCEDURE — 85610 PROTHROMBIN TIME: CPT | Performed by: THORACIC SURGERY (CARDIOTHORACIC VASCULAR SURGERY)

## 2021-01-31 PROCEDURE — 25010000002 HEPARIN (PORCINE) PER 1000 UNITS: Performed by: THORACIC SURGERY (CARDIOTHORACIC VASCULAR SURGERY)

## 2021-01-31 PROCEDURE — 85025 COMPLETE CBC W/AUTO DIFF WBC: CPT | Performed by: THORACIC SURGERY (CARDIOTHORACIC VASCULAR SURGERY)

## 2021-01-31 PROCEDURE — 93971 EXTREMITY STUDY: CPT

## 2021-01-31 PROCEDURE — 25010000002 ENOXAPARIN PER 10 MG: Performed by: THORACIC SURGERY (CARDIOTHORACIC VASCULAR SURGERY)

## 2021-01-31 RX ORDER — CEFAZOLIN SODIUM 2 G/100ML
2 INJECTION, SOLUTION INTRAVENOUS
Status: COMPLETED | OUTPATIENT
Start: 2021-02-01 | End: 2021-02-01

## 2021-01-31 RX ORDER — CLOPIDOGREL BISULFATE 75 MG/1
75 TABLET ORAL DAILY
Qty: 30 TABLET | Refills: 5 | Status: SHIPPED | OUTPATIENT
Start: 2021-01-31 | End: 2021-02-04

## 2021-01-31 RX ORDER — HEPARIN SODIUM 5000 [USP'U]/ML
40-80 INJECTION, SOLUTION INTRAVENOUS; SUBCUTANEOUS EVERY 6 HOURS PRN
Status: DISCONTINUED | OUTPATIENT
Start: 2021-01-31 | End: 2021-02-04

## 2021-01-31 RX ORDER — ISOSORBIDE MONONITRATE 60 MG/1
60 TABLET, EXTENDED RELEASE ORAL
Qty: 60 TABLET | Refills: 5 | Status: SHIPPED | OUTPATIENT
Start: 2021-01-31 | End: 2021-02-04

## 2021-01-31 RX ORDER — HEPARIN SODIUM 5000 [USP'U]/ML
80 INJECTION, SOLUTION INTRAVENOUS; SUBCUTANEOUS ONCE
Status: COMPLETED | OUTPATIENT
Start: 2021-01-31 | End: 2021-01-31

## 2021-01-31 RX ORDER — SODIUM CHLORIDE 9 MG/ML
75 INJECTION, SOLUTION INTRAVENOUS CONTINUOUS
Status: DISCONTINUED | OUTPATIENT
Start: 2021-01-31 | End: 2021-02-02

## 2021-01-31 RX ORDER — ATORVASTATIN CALCIUM 10 MG/1
10 TABLET, FILM COATED ORAL NIGHTLY
Qty: 60 TABLET | Refills: 5 | Status: SHIPPED | OUTPATIENT
Start: 2021-01-31 | End: 2021-02-04

## 2021-01-31 RX ORDER — HEPARIN SODIUM 10000 [USP'U]/100ML
16.4 INJECTION, SOLUTION INTRAVENOUS
Status: DISCONTINUED | OUTPATIENT
Start: 2021-01-31 | End: 2021-02-02

## 2021-01-31 RX ADMIN — HEPARIN SODIUM 7300 UNITS: 5000 INJECTION INTRAVENOUS; SUBCUTANEOUS at 16:17

## 2021-01-31 RX ADMIN — SODIUM CHLORIDE 75 ML/HR: 9 INJECTION, SOLUTION INTRAVENOUS at 18:19

## 2021-01-31 RX ADMIN — HEPARIN SODIUM 16.4 UNITS/KG/HR: 10000 INJECTION, SOLUTION INTRAVENOUS at 16:18

## 2021-01-31 RX ADMIN — HYDROCODONE BITARTRATE AND ACETAMINOPHEN 1 TABLET: 5; 325 TABLET ORAL at 14:47

## 2021-01-31 RX ADMIN — CLOPIDOGREL 75 MG: 75 TABLET, FILM COATED ORAL at 10:15

## 2021-01-31 RX ADMIN — ATORVASTATIN CALCIUM 10 MG: 20 TABLET, FILM COATED ORAL at 20:50

## 2021-01-31 RX ADMIN — ASPIRIN 81 MG: 81 TABLET, CHEWABLE ORAL at 10:15

## 2021-01-31 RX ADMIN — METOPROLOL TARTRATE 12.5 MG: 25 TABLET, FILM COATED ORAL at 10:15

## 2021-01-31 RX ADMIN — METOPROLOL TARTRATE 12.5 MG: 25 TABLET, FILM COATED ORAL at 20:50

## 2021-01-31 RX ADMIN — MORPHINE SULFATE 1 MG: 2 INJECTION, SOLUTION INTRAMUSCULAR; INTRAVENOUS at 16:00

## 2021-01-31 RX ADMIN — ONDANSETRON 4 MG: 2 INJECTION INTRAMUSCULAR; INTRAVENOUS at 15:22

## 2021-01-31 RX ADMIN — ACETAMINOPHEN 650 MG: 325 TABLET, FILM COATED ORAL at 13:12

## 2021-01-31 RX ADMIN — ISOSORBIDE MONONITRATE 60 MG: 60 TABLET ORAL at 10:15

## 2021-01-31 RX ADMIN — ENOXAPARIN SODIUM 40 MG: 40 INJECTION SUBCUTANEOUS at 14:47

## 2021-02-01 ENCOUNTER — ANESTHESIA (OUTPATIENT)
Dept: PERIOP | Facility: HOSPITAL | Age: 81
End: 2021-02-01

## 2021-02-01 ENCOUNTER — APPOINTMENT (OUTPATIENT)
Dept: GENERAL RADIOLOGY | Facility: HOSPITAL | Age: 81
End: 2021-02-01

## 2021-02-01 ENCOUNTER — ANESTHESIA EVENT (OUTPATIENT)
Dept: PERIOP | Facility: HOSPITAL | Age: 81
End: 2021-02-01

## 2021-02-01 PROBLEM — I82.422 ACUTE DEEP VEIN THROMBOSIS (DVT) OF LEFT ILIOFEMORAL VEIN (HCC): Status: ACTIVE | Noted: 2021-02-01

## 2021-02-01 LAB
ALBUMIN SERPL ELPH-MCNC: 3.1 G/DL (ref 2.9–4.4)
ALBUMIN SERPL-MCNC: 2.9 G/DL (ref 3.5–5.2)
ALBUMIN/GLOB SERPL: 0.6 {RATIO} (ref 0.7–1.7)
ALPHA1 GLOB SERPL ELPH-MCNC: 0.2 G/DL (ref 0–0.4)
ALPHA2 GLOB SERPL ELPH-MCNC: 0.7 G/DL (ref 0.4–1)
ANION GAP SERPL CALCULATED.3IONS-SCNC: 10.2 MMOL/L (ref 5–15)
APTT PPP: 107 SECONDS (ref 22.7–35.4)
APTT PPP: 125.5 SECONDS (ref 22.7–35.4)
APTT PPP: 168.7 SECONDS (ref 22.7–35.4)
APTT PPP: >200 SECONDS (ref 22.7–35.4)
APTT PPP: >200 SECONDS (ref 22.7–35.4)
B-GLOBULIN SERPL ELPH-MCNC: 4.8 G/DL (ref 0.7–1.3)
BASOPHILS # BLD AUTO: 0.03 10*3/MM3 (ref 0–0.2)
BASOPHILS NFR BLD AUTO: 0.4 % (ref 0–1.5)
BH BB BLOOD EXPIRATION DATE: NORMAL
BH BB BLOOD TYPE BARCODE: 5100
BH BB BLOOD TYPE BARCODE: 5100
BH BB BLOOD TYPE BARCODE: 7300
BH BB BLOOD TYPE BARCODE: 7300
BH BB DISPENSE STATUS: NORMAL
BH BB PRODUCT CODE: NORMAL
BH BB UNIT NUMBER: NORMAL
BUN SERPL-MCNC: 26 MG/DL (ref 8–23)
BUN/CREAT SERPL: 17.6 (ref 7–25)
CALCIUM SPEC-SCNC: 8.7 MG/DL (ref 8.6–10.5)
CHLORIDE SERPL-SCNC: 103 MMOL/L (ref 98–107)
CO2 SERPL-SCNC: 22.8 MMOL/L (ref 22–29)
CREAT SERPL-MCNC: 1.48 MG/DL (ref 0.76–1.27)
CROSSMATCH INTERPRETATION: NORMAL
DEPRECATED RDW RBC AUTO: 57.1 FL (ref 37–54)
EOSINOPHIL # BLD AUTO: 0.16 10*3/MM3 (ref 0–0.4)
EOSINOPHIL NFR BLD AUTO: 2 % (ref 0.3–6.2)
ERYTHROCYTE [DISTWIDTH] IN BLOOD BY AUTOMATED COUNT: 16.9 % (ref 12.3–15.4)
GAMMA GLOB SERPL ELPH-MCNC: 0.3 G/DL (ref 0.4–1.8)
GFR SERPL CREATININE-BSD FRML MDRD: 46 ML/MIN/1.73
GLOBULIN SER-MCNC: 6 G/DL (ref 2.2–3.9)
GLUCOSE SERPL-MCNC: 101 MG/DL (ref 65–99)
HCT VFR BLD AUTO: 25.5 % (ref 37.5–51)
HGB BLD-MCNC: 8.2 G/DL (ref 13–17.7)
IGA SERPL-MCNC: 4948 MG/DL (ref 61–437)
IGG SERPL-MCNC: 231 MG/DL (ref 603–1613)
IGM SERPL-MCNC: 5 MG/DL (ref 15–143)
IMM GRANULOCYTES # BLD AUTO: 0.08 10*3/MM3 (ref 0–0.05)
IMM GRANULOCYTES NFR BLD AUTO: 1 % (ref 0–0.5)
INTERPRETATION SERPL IEP-IMP: ABNORMAL
KAPPA LC FREE SER-MCNC: 6.4 MG/L (ref 3.3–19.4)
KAPPA LC FREE/LAMBDA FREE SER: 0.05 {RATIO} (ref 0.26–1.65)
LABORATORY COMMENT REPORT: ABNORMAL
LAMBDA LC FREE SERPL-MCNC: 126.4 MG/L (ref 5.7–26.3)
LYMPHOCYTES # BLD AUTO: 1.81 10*3/MM3 (ref 0.7–3.1)
LYMPHOCYTES NFR BLD AUTO: 22.8 % (ref 19.6–45.3)
M PROTEIN SERPL ELPH-MCNC: ABNORMAL G/DL
MAGNESIUM SERPL-MCNC: 2.5 MG/DL (ref 1.6–2.4)
MCH RBC QN AUTO: 30.3 PG (ref 26.6–33)
MCHC RBC AUTO-ENTMCNC: 32.2 G/DL (ref 31.5–35.7)
MCV RBC AUTO: 94.1 FL (ref 79–97)
MONOCYTES # BLD AUTO: 0.88 10*3/MM3 (ref 0.1–0.9)
MONOCYTES NFR BLD AUTO: 11.1 % (ref 5–12)
NEUTROPHILS NFR BLD AUTO: 4.97 10*3/MM3 (ref 1.7–7)
NEUTROPHILS NFR BLD AUTO: 62.7 % (ref 42.7–76)
NRBC BLD AUTO-RTO: 0.4 /100 WBC (ref 0–0.2)
PHOSPHATE SERPL-MCNC: 4 MG/DL (ref 2.5–4.5)
PLATELET # BLD AUTO: 125 10*3/MM3 (ref 140–450)
PMV BLD AUTO: 11.3 FL (ref 6–12)
POTASSIUM SERPL-SCNC: 4.2 MMOL/L (ref 3.5–5.2)
PROT SERPL-MCNC: 9.1 G/DL (ref 6–8.5)
RBC # BLD AUTO: 2.71 10*6/MM3 (ref 4.14–5.8)
SARS-COV-2 RNA RESP QL NAA+PROBE: NOT DETECTED
SODIUM SERPL-SCNC: 136 MMOL/L (ref 136–145)
UNIT  ABO: NORMAL
UNIT  RH: NORMAL
URATE SERPL-MCNC: 8.2 MG/DL (ref 3.4–7)
WBC # BLD AUTO: 7.93 10*3/MM3 (ref 3.4–10.8)

## 2021-02-01 PROCEDURE — 25010000003 LIDOCAINE 1 % SOLUTION 20 ML VIAL: Performed by: SURGERY

## 2021-02-01 PROCEDURE — C1894 INTRO/SHEATH, NON-LASER: HCPCS | Performed by: SURGERY

## 2021-02-01 PROCEDURE — 25010000002 DEXAMETHASONE PER 1 MG: Performed by: ANESTHESIOLOGY

## 2021-02-01 PROCEDURE — 06703DZ DILATION OF INFERIOR VENA CAVA WITH INTRALUMINAL DEVICE, PERCUTANEOUS APPROACH: ICD-10-PCS | Performed by: SURGERY

## 2021-02-01 PROCEDURE — 85025 COMPLETE CBC W/AUTO DIFF WBC: CPT | Performed by: THORACIC SURGERY (CARDIOTHORACIC VASCULAR SURGERY)

## 2021-02-01 PROCEDURE — B5191ZZ FLUOROSCOPY OF INFERIOR VENA CAVA USING LOW OSMOLAR CONTRAST: ICD-10-PCS | Performed by: SURGERY

## 2021-02-01 PROCEDURE — 25010000002 HEPARIN (PORCINE) PER 1000 UNITS: Performed by: SURGERY

## 2021-02-01 PROCEDURE — 99232 SBSQ HOSP IP/OBS MODERATE 35: CPT | Performed by: NURSE PRACTITIONER

## 2021-02-01 PROCEDURE — B51C1ZZ FLUOROSCOPY OF LEFT LOWER EXTREMITY VEINS USING LOW OSMOLAR CONTRAST: ICD-10-PCS | Performed by: SURGERY

## 2021-02-01 PROCEDURE — 25010000002 NEOSTIGMINE PER 0.5 MG: Performed by: NURSE ANESTHETIST, CERTIFIED REGISTERED

## 2021-02-01 PROCEDURE — C1725 CATH, TRANSLUMIN NON-LASER: HCPCS | Performed by: SURGERY

## 2021-02-01 PROCEDURE — 25010000002 FENTANYL CITRATE (PF) 100 MCG/2ML SOLUTION: Performed by: ANESTHESIOLOGY

## 2021-02-01 PROCEDURE — C1887 CATHETER, GUIDING: HCPCS | Performed by: SURGERY

## 2021-02-01 PROCEDURE — C1769 GUIDE WIRE: HCPCS | Performed by: SURGERY

## 2021-02-01 PROCEDURE — 25010000003 CEFAZOLIN IN DEXTROSE 2-4 GM/100ML-% SOLUTION: Performed by: SURGERY

## 2021-02-01 PROCEDURE — 83735 ASSAY OF MAGNESIUM: CPT | Performed by: INTERNAL MEDICINE

## 2021-02-01 PROCEDURE — C1876 STENT, NON-COA/NON-COV W/DEL: HCPCS | Performed by: SURGERY

## 2021-02-01 PROCEDURE — 25010000002 HEPARIN (PORCINE) PER 1000 UNITS: Performed by: THORACIC SURGERY (CARDIOTHORACIC VASCULAR SURGERY)

## 2021-02-01 PROCEDURE — 99232 SBSQ HOSP IP/OBS MODERATE 35: CPT | Performed by: INTERNAL MEDICINE

## 2021-02-01 PROCEDURE — 80069 RENAL FUNCTION PANEL: CPT | Performed by: INTERNAL MEDICINE

## 2021-02-01 PROCEDURE — 25010000002 HYDROMORPHONE PER 4 MG: Performed by: ANESTHESIOLOGY

## 2021-02-01 PROCEDURE — 84550 ASSAY OF BLOOD/URIC ACID: CPT | Performed by: INTERNAL MEDICINE

## 2021-02-01 PROCEDURE — 25010000002 HEPARIN (PORCINE) PER 1000 UNITS: Performed by: NURSE ANESTHETIST, CERTIFIED REGISTERED

## 2021-02-01 PROCEDURE — U0003 INFECTIOUS AGENT DETECTION BY NUCLEIC ACID (DNA OR RNA); SEVERE ACUTE RESPIRATORY SYNDROME CORONAVIRUS 2 (SARS-COV-2) (CORONAVIRUS DISEASE [COVID-19]), AMPLIFIED PROBE TECHNIQUE, MAKING USE OF HIGH THROUGHPUT TECHNOLOGIES AS DESCRIBED BY CMS-2020-01-R: HCPCS | Performed by: THORACIC SURGERY (CARDIOTHORACIC VASCULAR SURGERY)

## 2021-02-01 PROCEDURE — 25010000002 ONDANSETRON PER 1 MG: Performed by: NURSE ANESTHETIST, CERTIFIED REGISTERED

## 2021-02-01 PROCEDURE — 0 IOPAMIDOL PER 1 ML: Performed by: THORACIC SURGERY (CARDIOTHORACIC VASCULAR SURGERY)

## 2021-02-01 PROCEDURE — 85730 THROMBOPLASTIN TIME PARTIAL: CPT | Performed by: SURGERY

## 2021-02-01 PROCEDURE — 85730 THROMBOPLASTIN TIME PARTIAL: CPT | Performed by: THORACIC SURGERY (CARDIOTHORACIC VASCULAR SURGERY)

## 2021-02-01 PROCEDURE — 25010000002 PROPOFOL 10 MG/ML EMULSION: Performed by: NURSE ANESTHETIST, CERTIFIED REGISTERED

## 2021-02-01 PROCEDURE — 25010000002 PHENYLEPHRINE PER 1 ML: Performed by: ANESTHESIOLOGY

## 2021-02-01 PROCEDURE — C1773 RET DEV, INSERTABLE: HCPCS | Performed by: SURGERY

## 2021-02-01 DEVICE — IMPLANTABLE DEVICE: Type: IMPLANTABLE DEVICE | Site: LEG | Status: FUNCTIONAL

## 2021-02-01 RX ORDER — DEXAMETHASONE SODIUM PHOSPHATE 10 MG/ML
INJECTION INTRAMUSCULAR; INTRAVENOUS AS NEEDED
Status: DISCONTINUED | OUTPATIENT
Start: 2021-02-01 | End: 2021-02-01 | Stop reason: SURG

## 2021-02-01 RX ORDER — MIDAZOLAM HYDROCHLORIDE 1 MG/ML
0.5 INJECTION INTRAMUSCULAR; INTRAVENOUS
Status: DISCONTINUED | OUTPATIENT
Start: 2021-02-01 | End: 2021-02-01 | Stop reason: HOSPADM

## 2021-02-01 RX ORDER — PROPOFOL 10 MG/ML
VIAL (ML) INTRAVENOUS AS NEEDED
Status: DISCONTINUED | OUTPATIENT
Start: 2021-02-01 | End: 2021-02-01 | Stop reason: SURG

## 2021-02-01 RX ORDER — SODIUM CHLORIDE 9 MG/ML
100 INJECTION, SOLUTION INTRAVENOUS CONTINUOUS
Status: DISCONTINUED | OUTPATIENT
Start: 2021-02-01 | End: 2021-02-02

## 2021-02-01 RX ORDER — HYDROMORPHONE HYDROCHLORIDE 1 MG/ML
0.4 INJECTION, SOLUTION INTRAMUSCULAR; INTRAVENOUS; SUBCUTANEOUS
Status: DISCONTINUED | OUTPATIENT
Start: 2021-02-01 | End: 2021-02-01 | Stop reason: HOSPADM

## 2021-02-01 RX ORDER — EPHEDRINE SULFATE 50 MG/ML
5 INJECTION, SOLUTION INTRAVENOUS ONCE AS NEEDED
Status: DISCONTINUED | OUTPATIENT
Start: 2021-02-01 | End: 2021-02-01 | Stop reason: HOSPADM

## 2021-02-01 RX ORDER — DIPHENHYDRAMINE HYDROCHLORIDE 50 MG/ML
6.25 INJECTION INTRAMUSCULAR; INTRAVENOUS
Status: DISCONTINUED | OUTPATIENT
Start: 2021-02-01 | End: 2021-02-01 | Stop reason: HOSPADM

## 2021-02-01 RX ORDER — HYDROMORPHONE HYDROCHLORIDE 1 MG/ML
0.2 INJECTION, SOLUTION INTRAMUSCULAR; INTRAVENOUS; SUBCUTANEOUS
Status: DISCONTINUED | OUTPATIENT
Start: 2021-02-01 | End: 2021-02-01 | Stop reason: HOSPADM

## 2021-02-01 RX ORDER — HEPARIN SODIUM 1000 [USP'U]/ML
INJECTION, SOLUTION INTRAVENOUS; SUBCUTANEOUS AS NEEDED
Status: DISCONTINUED | OUTPATIENT
Start: 2021-02-01 | End: 2021-02-01 | Stop reason: SURG

## 2021-02-01 RX ORDER — FENTANYL CITRATE 50 UG/ML
50 INJECTION, SOLUTION INTRAMUSCULAR; INTRAVENOUS
Status: DISCONTINUED | OUTPATIENT
Start: 2021-02-01 | End: 2021-02-01 | Stop reason: HOSPADM

## 2021-02-01 RX ORDER — LABETALOL HYDROCHLORIDE 5 MG/ML
5 INJECTION, SOLUTION INTRAVENOUS
Status: DISCONTINUED | OUTPATIENT
Start: 2021-02-01 | End: 2021-02-01 | Stop reason: HOSPADM

## 2021-02-01 RX ORDER — FENTANYL CITRATE 50 UG/ML
25 INJECTION, SOLUTION INTRAMUSCULAR; INTRAVENOUS
Status: DISCONTINUED | OUTPATIENT
Start: 2021-02-01 | End: 2021-02-01 | Stop reason: HOSPADM

## 2021-02-01 RX ORDER — HYDRALAZINE HYDROCHLORIDE 20 MG/ML
10 INJECTION INTRAMUSCULAR; INTRAVENOUS
Status: DISCONTINUED | OUTPATIENT
Start: 2021-02-01 | End: 2021-02-01 | Stop reason: HOSPADM

## 2021-02-01 RX ORDER — SODIUM CHLORIDE 0.9 % (FLUSH) 0.9 %
3 SYRINGE (ML) INJECTION EVERY 12 HOURS SCHEDULED
Status: DISCONTINUED | OUTPATIENT
Start: 2021-02-01 | End: 2021-02-01 | Stop reason: HOSPADM

## 2021-02-01 RX ORDER — HYDROCODONE BITARTRATE AND ACETAMINOPHEN 5; 325 MG/1; MG/1
1 TABLET ORAL ONCE AS NEEDED
Status: DISCONTINUED | OUTPATIENT
Start: 2021-02-01 | End: 2021-02-01 | Stop reason: HOSPADM

## 2021-02-01 RX ORDER — FENTANYL CITRATE 50 UG/ML
INJECTION, SOLUTION INTRAMUSCULAR; INTRAVENOUS AS NEEDED
Status: DISCONTINUED | OUTPATIENT
Start: 2021-02-01 | End: 2021-02-01 | Stop reason: SURG

## 2021-02-01 RX ORDER — SODIUM CHLORIDE 0.9 % (FLUSH) 0.9 %
3-10 SYRINGE (ML) INJECTION AS NEEDED
Status: DISCONTINUED | OUTPATIENT
Start: 2021-02-01 | End: 2021-02-01 | Stop reason: HOSPADM

## 2021-02-01 RX ORDER — FAMOTIDINE 10 MG/ML
20 INJECTION, SOLUTION INTRAVENOUS ONCE
Status: COMPLETED | OUTPATIENT
Start: 2021-02-01 | End: 2021-02-01

## 2021-02-01 RX ORDER — NALOXONE HCL 0.4 MG/ML
0.4 VIAL (ML) INJECTION AS NEEDED
Status: DISCONTINUED | OUTPATIENT
Start: 2021-02-01 | End: 2021-02-01 | Stop reason: HOSPADM

## 2021-02-01 RX ORDER — LIDOCAINE HYDROCHLORIDE 10 MG/ML
0.5 INJECTION, SOLUTION EPIDURAL; INFILTRATION; INTRACAUDAL; PERINEURAL ONCE AS NEEDED
Status: DISCONTINUED | OUTPATIENT
Start: 2021-02-01 | End: 2021-02-01 | Stop reason: HOSPADM

## 2021-02-01 RX ORDER — OXYCODONE HYDROCHLORIDE AND ACETAMINOPHEN 5; 325 MG/1; MG/1
1 TABLET ORAL ONCE AS NEEDED
Status: DISCONTINUED | OUTPATIENT
Start: 2021-02-01 | End: 2021-02-01 | Stop reason: HOSPADM

## 2021-02-01 RX ORDER — MIDAZOLAM HYDROCHLORIDE 1 MG/ML
1 INJECTION INTRAMUSCULAR; INTRAVENOUS
Status: DISCONTINUED | OUTPATIENT
Start: 2021-02-01 | End: 2021-02-01 | Stop reason: HOSPADM

## 2021-02-01 RX ORDER — ONDANSETRON 2 MG/ML
4 INJECTION INTRAMUSCULAR; INTRAVENOUS ONCE AS NEEDED
Status: DISCONTINUED | OUTPATIENT
Start: 2021-02-01 | End: 2021-02-01 | Stop reason: HOSPADM

## 2021-02-01 RX ORDER — FLUMAZENIL 0.1 MG/ML
0.2 INJECTION INTRAVENOUS AS NEEDED
Status: DISCONTINUED | OUTPATIENT
Start: 2021-02-01 | End: 2021-02-01 | Stop reason: HOSPADM

## 2021-02-01 RX ORDER — GLYCOPYRROLATE 0.2 MG/ML
INJECTION INTRAMUSCULAR; INTRAVENOUS AS NEEDED
Status: DISCONTINUED | OUTPATIENT
Start: 2021-02-01 | End: 2021-02-01 | Stop reason: SURG

## 2021-02-01 RX ORDER — ONDANSETRON 2 MG/ML
INJECTION INTRAMUSCULAR; INTRAVENOUS AS NEEDED
Status: DISCONTINUED | OUTPATIENT
Start: 2021-02-01 | End: 2021-02-01 | Stop reason: SURG

## 2021-02-01 RX ORDER — SODIUM CHLORIDE, SODIUM LACTATE, POTASSIUM CHLORIDE, CALCIUM CHLORIDE 600; 310; 30; 20 MG/100ML; MG/100ML; MG/100ML; MG/100ML
9 INJECTION, SOLUTION INTRAVENOUS CONTINUOUS
Status: DISCONTINUED | OUTPATIENT
Start: 2021-02-01 | End: 2021-02-02

## 2021-02-01 RX ORDER — ROCURONIUM BROMIDE 10 MG/ML
INJECTION, SOLUTION INTRAVENOUS AS NEEDED
Status: DISCONTINUED | OUTPATIENT
Start: 2021-02-01 | End: 2021-02-01 | Stop reason: SURG

## 2021-02-01 RX ADMIN — NEOSTIGMINE METHYLSULFATE 0.5 MG: 1 INJECTION INTRAMUSCULAR; INTRAVENOUS; SUBCUTANEOUS at 18:08

## 2021-02-01 RX ADMIN — PHENYLEPHRINE HYDROCHLORIDE 200 MCG: 10 INJECTION INTRAVENOUS at 15:03

## 2021-02-01 RX ADMIN — FAMOTIDINE 20 MG: 10 INJECTION, SOLUTION INTRAVENOUS at 14:00

## 2021-02-01 RX ADMIN — METOPROLOL TARTRATE 12.5 MG: 25 TABLET, FILM COATED ORAL at 23:29

## 2021-02-01 RX ADMIN — PHENYLEPHRINE HYDROCHLORIDE 100 MCG: 10 INJECTION INTRAVENOUS at 15:43

## 2021-02-01 RX ADMIN — HYDROMORPHONE HYDROCHLORIDE 0.4 MG: 1 INJECTION, SOLUTION INTRAMUSCULAR; INTRAVENOUS; SUBCUTANEOUS at 18:39

## 2021-02-01 RX ADMIN — HEPARIN SODIUM 11.4 UNITS/KG/HR: 10000 INJECTION, SOLUTION INTRAVENOUS at 12:23

## 2021-02-01 RX ADMIN — CEFAZOLIN SODIUM 2 G: 2 INJECTION, SOLUTION INTRAVENOUS at 14:52

## 2021-02-01 RX ADMIN — PHENYLEPHRINE HYDROCHLORIDE 100 MCG: 10 INJECTION INTRAVENOUS at 15:34

## 2021-02-01 RX ADMIN — CLOPIDOGREL 75 MG: 75 TABLET, FILM COATED ORAL at 09:25

## 2021-02-01 RX ADMIN — PHENYLEPHRINE HYDROCHLORIDE 100 MCG: 10 INJECTION INTRAVENOUS at 15:56

## 2021-02-01 RX ADMIN — ROCURONIUM BROMIDE 50 MG: 10 INJECTION INTRAVENOUS at 15:00

## 2021-02-01 RX ADMIN — SODIUM CHLORIDE, POTASSIUM CHLORIDE, SODIUM LACTATE AND CALCIUM CHLORIDE 9 ML/HR: 600; 310; 30; 20 INJECTION, SOLUTION INTRAVENOUS at 14:30

## 2021-02-01 RX ADMIN — PROPOFOL 200 MG: 10 INJECTION, EMULSION INTRAVENOUS at 14:58

## 2021-02-01 RX ADMIN — SODIUM CHLORIDE: 9 INJECTION, SOLUTION INTRAVENOUS at 14:52

## 2021-02-01 RX ADMIN — ONDANSETRON HYDROCHLORIDE 4 MG: 2 SOLUTION INTRAMUSCULAR; INTRAVENOUS at 17:54

## 2021-02-01 RX ADMIN — DEXAMETHASONE SODIUM PHOSPHATE 4 MG: 10 INJECTION INTRAMUSCULAR; INTRAVENOUS at 15:25

## 2021-02-01 RX ADMIN — SODIUM CHLORIDE 75 ML/HR: 9 INJECTION, SOLUTION INTRAVENOUS at 09:25

## 2021-02-01 RX ADMIN — SODIUM CHLORIDE 100 ML/HR: 9 INJECTION, SOLUTION INTRAVENOUS at 23:30

## 2021-02-01 RX ADMIN — HEPARIN SODIUM 5000 UNITS: 1000 INJECTION, SOLUTION INTRAVENOUS; SUBCUTANEOUS at 18:02

## 2021-02-01 RX ADMIN — FENTANYL CITRATE 25 MCG: 50 INJECTION INTRAMUSCULAR; INTRAVENOUS at 17:01

## 2021-02-01 RX ADMIN — PHENYLEPHRINE HYDROCHLORIDE 100 MCG: 10 INJECTION INTRAVENOUS at 15:19

## 2021-02-01 RX ADMIN — PHENYLEPHRINE HYDROCHLORIDE 100 MCG: 10 INJECTION INTRAVENOUS at 15:29

## 2021-02-01 RX ADMIN — ASPIRIN 81 MG: 81 TABLET, CHEWABLE ORAL at 09:25

## 2021-02-01 RX ADMIN — FENTANYL CITRATE 25 MCG: 50 INJECTION INTRAMUSCULAR; INTRAVENOUS at 17:06

## 2021-02-01 RX ADMIN — ISOSORBIDE MONONITRATE 60 MG: 60 TABLET ORAL at 09:25

## 2021-02-01 RX ADMIN — IOPAMIDOL 282 ML: 510 INJECTION, SOLUTION INTRAVASCULAR at 18:38

## 2021-02-01 RX ADMIN — SODIUM CHLORIDE: 9 INJECTION, SOLUTION INTRAVENOUS at 17:48

## 2021-02-01 RX ADMIN — FENTANYL CITRATE 25 MCG: 50 INJECTION INTRAMUSCULAR; INTRAVENOUS at 16:27

## 2021-02-01 RX ADMIN — GLYCOPYRROLATE 0.1 MG: 0.2 INJECTION INTRAMUSCULAR; INTRAVENOUS at 18:08

## 2021-02-01 RX ADMIN — METOPROLOL TARTRATE 12.5 MG: 25 TABLET, FILM COATED ORAL at 09:25

## 2021-02-01 RX ADMIN — PHENYLEPHRINE HYDROCHLORIDE 150 MCG: 10 INJECTION INTRAVENOUS at 17:43

## 2021-02-01 RX ADMIN — FENTANYL CITRATE 25 MCG: 50 INJECTION INTRAMUSCULAR; INTRAVENOUS at 16:34

## 2021-02-01 NOTE — OP NOTE
Operative Note  Date of Admission:  1/28/2021  OR Date: 2/1/2021    Pre-op Diagnosis:   Left Iliofemoral DVT with significant leg swelling    Post-op Diagnosis:   same    Procedure: Duplex ultrasound-guided percutaneous access of the popliteal veins bilaterally with left lower extremity venogram; inferior venacavogram; contralateral selection of the left common iliac vein with snaring of the guidewire, angioplasty of left common iliac and external iliac veins with 6 mm x 60 mm balloon followed by 10 mm x 40 mm balloon; placement of 14 mm x 100 mm venous stent postdilated to 14 mm size    Surgeon: Mckenzie Christensen Jr, MD    Assistant: ARBEN Mendiola CSA    Anesthesia: Choice    Staff:   Circulator: Chiquis Mitchell RN; Miguel A Mendenhall RN  Scrub Person: Berta Foster; Gemini Bhatt  Assistant: Nidhi Quinteros CSA  Vascular Radiology Technician: Lila Arevalo, BISHOP; Claus Padron; Ashley West    Estimated Blood Loss:  cc    Specimens: None  * No orders in the log *    Complications: None apparent    Findings: 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  After satisfactory general endotracheal anesthesia was achieved the patient was then rolled into the prone position with appropriate padding of the face, shoulders and arms, axilla and hips.  These were slightly flexed and the feet were on 2 pillows.  Both posterior legs from the buttocks to the ankles were prepped using ChloraPrep and draped in usual sterile fashion.  Using duplex ultrasound the left popliteal vein was identified.  It was accessed under direct vision and guidewire advanced.  Guidewire was advanced to the external iliac vein.  An 8 Arabic sheath was then placed over the guidewire.  Hand-held injections of contrast demonstrated the popliteal vein, femoral  vein, common femoral veins to be all widely patent without thrombus.  The guidewire was able to be advanced into the distal external iliac vein but difficulty advancing beyond this point.  A Berenstein catheter was then used to assist in directing the guidewire but it would continually course into the patent azygos system.  I was able to advance a guidewire through the azygos system into the inferior vena cava but this was very tortuous.  After multiple attempts to traverse the area of occlusion it was decided to stick the contralateral popliteal vein to see if we could do a contralateral common iliac access.  Using duplex ultrasound the right popliteal vein was accessed and a guidewire advanced.  A 6 Ukrainian sheath was then placed over the guidewire.  Hand-held injection of contrast demonstrated the stump of the left common iliac vein.  Multiple attempts were used to try to access this vein first using a rim catheter then a Motarjeme catheter but the guidewire could not be advanced.  Therefore, a 7 Ukrainian  sheath was then placed over the guidewire to this opening.  Using this as well as an angled Funez cross catheter I was able to advance a guidewire into the left hypogastric vein.  With a angled Funez cross catheter on the left I was able to get a guidewire into the hypogastric vein as well.  I then attempted to get a snare sheath into the hypogastric vein but could not do so.  With more manipulation I was able to get the angled Funez cross from the right into the distal external iliac vein on the left.  The snare was then used to grasp this guidewire and pull it through the sheath on the left.  First a 6 mm x 60 mm balloon was then used to dilate the tract from the left external iliac vein into the inferior vena cava.  This was followed by a 10 mm x 40 mm balloon.  Junction through the sheath demonstrated patency of the left iliac system but significant stenoses with a more than 90% stenosis in the proximal  external iliac vein.  A 14 mm x 100 mm venous stent was then placed over the guidewire and this was deployed with a small portion in the inferior vena cava back into the mid external iliac vein.  This was postdilated with a 14 mm x 40 mm balloon up to 24 chris, 3 inflations to achieve this.  Hand-held injection through the sheath demonstrated brisk flow through the left iliac system into the inferior vena cava.  Guidewires and catheters were removed.  D-Stat dry hemostatic patches were placed over both puncture sites.  Both legs were wrapped with sterile Kerlix followed by 4 inch Ace wrap on the right and 4 inch and 6 inch Ace wraps from the ankle to the groin on the left.  Sponge, needle, and instrument counts are reported as correct.  Patient was extubated and transported to recovery room in satisfactory condition.      Radiographic Findings:  1) as described above      Active Hospital Problems    Diagnosis  POA   • **Coronary artery disease of native heart with stable angina pectoris (CMS/HCC) [I25.118]  Yes   • CAD (coronary artery disease) [I25.10]  Yes   • Foot callus [L84]  Yes   • Asthma [J45.909]  Yes   • Hypertensive disorder [I10]  Yes      Resolved Hospital Problems   No resolved problems to display.      Mckenzie Christensen Jr., MD     Date: 2/1/2021  Time: 18:27 EST

## 2021-02-01 NOTE — ANESTHESIA PROCEDURE NOTES
Airway  Urgency: elective    Date/Time: 2/1/2021 3:01 PM  Airway not difficult    General Information and Staff    Patient location during procedure: OR  Anesthesiologist: Arsh Devries MD    Indications and Patient Condition  Indications for airway management: airway protection    Preoxygenated: yes  Mask difficulty assessment: 1 - vent by mask    Final Airway Details  Final airway type: endotracheal airway      Successful airway: ETT  Cuffed: yes   Successful intubation technique: direct laryngoscopy  Endotracheal tube insertion site: oral  Blade: Linda  Blade size: 3  ETT size (mm): 7.5  Cormack-Lehane Classification: grade IIa - partial view of glottis  Placement verified by: chest auscultation and capnometry   Measured from: lips  ETT/EBT  to lips (cm): 22  Number of attempts at approach: 2 (laryngoscope light failure on 1st attempt; otherwise easy)  Assessment: lips, teeth, and gum same as pre-op and atraumatic intubation

## 2021-02-01 NOTE — PROGRESS NOTES
KUB with IVC filter present.    Patient can NOT have thrombolytic therapy with left main perforation at time of percutaneous coronary stent placement from right radial artery approach on 11/29/2021 in discussion with Dr. Golden.    Patient with significant left thigh and calf swelling and elevated tonight and now started on intravenous heparin.    Plan mechanical thrombectomy tomorrow with venogram. Patient receiving normal saline hydration tonight with chronic renal insufficiency. Risks of procedure to be performed by partner tomorrow discussed in detail with patient with nurse present. All questions answered.

## 2021-02-01 NOTE — PROGRESS NOTES
" LOS: 4 days   Patient Care Team:  Adolph Christensen MD as PCP - General (Internal Medicine)    Chief Complaint: CAD    Subjective:  Symptoms:  No shortness of breath or chest pain.    Diet:  NPO.    Pain:  He reports no pain.          Vital Signs  Temp:  [97.4 °F (36.3 °C)-98.4 °F (36.9 °C)] 98.4 °F (36.9 °C)  Heart Rate:  [59-65] 59  Resp:  [18-20] 18  BP: (112-150)/(50-84) 143/84  Body mass index is 22.36 kg/m².    Intake/Output Summary (Last 24 hours) at 2/1/2021 1006  Last data filed at 2/1/2021 0804  Gross per 24 hour   Intake 120 ml   Output 200 ml   Net -80 ml     No intake/output data recorded.            01/30/21  0307 01/30/21  1255 02/01/21  0612   Weight: 91.4 kg (201 lb 8 oz) 91.2 kg (201 lb) 87.8 kg (193 lb 8 oz)         Objective:  General Appearance:  Comfortable.    Vital signs: (most recent): Blood pressure 128/72, pulse 59, temperature 98.5 °F (36.9 °C), temperature source Oral, resp. rate 18, height 198.1 cm (78\"), weight 87.8 kg (193 lb 8 oz), SpO2 91 %.    Lungs:  Normal effort and normal respiratory rate.    Heart: Normal rate.  Regular rhythm.    Extremities: There is dependent edema (1+ pretibial left leg).    Neurological: Patient is alert and oriented to person, place and time.    Skin:  Warm and dry.              Results Review:        WBC WBC   Date Value Ref Range Status   02/01/2021 7.93 3.40 - 10.80 10*3/mm3 Final   01/31/2021 9.08 3.40 - 10.80 10*3/mm3 Final   01/31/2021 6.36 3.40 - 10.80 10*3/mm3 Final   01/30/2021 6.13 3.40 - 10.80 10*3/mm3 Final      HGB Hemoglobin   Date Value Ref Range Status   02/01/2021 8.2 (L) 13.0 - 17.7 g/dL Final   01/31/2021 8.8 (L) 13.0 - 17.7 g/dL Final   01/31/2021 8.4 (L) 13.0 - 17.7 g/dL Final   01/30/2021 8.0 (L) 13.0 - 17.7 g/dL Final      HCT Hematocrit   Date Value Ref Range Status   02/01/2021 25.5 (L) 37.5 - 51.0 % Final   01/31/2021 25.7 (L) 37.5 - 51.0 % Final   01/31/2021 25.1 (L) 37.5 - 51.0 % Final   01/30/2021 23.8 (L) 37.5 - 51.0 % " Final      Platelets Platelets   Date Value Ref Range Status   02/01/2021 125 (L) 140 - 450 10*3/mm3 Final   01/31/2021 134 (L) 140 - 450 10*3/mm3 Final   01/31/2021 131 (L) 140 - 450 10*3/mm3 Final   01/30/2021 147 140 - 450 10*3/mm3 Final        PT/INR:    Protime   Date Value Ref Range Status   01/31/2021 15.8 (H) 11.7 - 14.2 Seconds Final   /  INR   Date Value Ref Range Status   01/31/2021 1.28 (H) 0.90 - 1.10 Final       Sodium Sodium   Date Value Ref Range Status   02/01/2021 136 136 - 145 mmol/L Final   01/31/2021 137 136 - 145 mmol/L Final   01/30/2021 138 136 - 145 mmol/L Final      Potassium Potassium   Date Value Ref Range Status   02/01/2021 4.2 3.5 - 5.2 mmol/L Final   01/31/2021 3.8 3.5 - 5.2 mmol/L Final   01/30/2021 3.6 3.5 - 5.2 mmol/L Final      Chloride Chloride   Date Value Ref Range Status   02/01/2021 103 98 - 107 mmol/L Final   01/31/2021 103 98 - 107 mmol/L Final   01/30/2021 105 98 - 107 mmol/L Final      Bicarbonate CO2   Date Value Ref Range Status   02/01/2021 22.8 22.0 - 29.0 mmol/L Final   01/31/2021 21.4 (L) 22.0 - 29.0 mmol/L Final   01/30/2021 23.0 22.0 - 29.0 mmol/L Final      BUN BUN   Date Value Ref Range Status   02/01/2021 26 (H) 8 - 23 mg/dL Final   01/31/2021 26 (H) 8 - 23 mg/dL Final   01/30/2021 27 (H) 8 - 23 mg/dL Final      Creatinine Creatinine   Date Value Ref Range Status   02/01/2021 1.48 (H) 0.76 - 1.27 mg/dL Final   01/31/2021 1.44 (H) 0.76 - 1.27 mg/dL Final   01/30/2021 1.59 (H) 0.76 - 1.27 mg/dL Final      Calcium Calcium   Date Value Ref Range Status   02/01/2021 8.7 8.6 - 10.5 mg/dL Final   01/31/2021 8.7 8.6 - 10.5 mg/dL Final   01/30/2021 8.3 (L) 8.6 - 10.5 mg/dL Final      Magnesium Magnesium   Date Value Ref Range Status   02/01/2021 2.5 (H) 1.6 - 2.4 mg/dL Final   01/31/2021 2.5 (H) 1.6 - 2.4 mg/dL Final      Troponin No results found for: TROPONIN   BNP No results found for: BNP         aspirin, 81 mg, Oral, Daily  atorvastatin, 10 mg, Oral,  Nightly  ceFAZolin, 2 g, Intravenous, 30 Min Pre-Op  clopidogrel, 75 mg, Oral, Daily  isosorbide mononitrate, 60 mg, Oral, Q24H  metoprolol tartrate, 12.5 mg, Oral, Q12H      heparin (porcine), 16.4 Units/kg/hr, Last Rate: 11.4 Units/kg/hr (02/01/21 0949)  sodium chloride, 75 mL/hr, Last Rate: 75 mL/hr (02/01/21 0925)        PRN Meds:.•  acetaminophen  •  heparin (porcine)  •  HYDROcodone-acetaminophen  •  Morphine **AND** naloxone  •  ondansetron **OR** ondansetron  •  temazepam    Patient Active Problem List   Diagnosis Code   • Coronary artery disease of native heart with stable angina pectoris (CMS/[facility]) I25.118   • CAD (coronary artery disease) I25.10   • Asthma J45.909   • Foot callus L84   • Hypercholesterolemia E78.00   • Hypertensive disorder I10       Assessment & Plan    -NSTEMI (Dr. Apolinar Marin), severe multivessel CAD s/p complex PCI LM, LAD, and Lcx----complicated by left main dissection (sealed), no effusion on echo (Semder), on Plavix  -DVT, acute, LLE external iliac, common femoral, deep femoral, proximal femoral, mid femoral, distal femoral, popliteal, posterial tibial, peroneal, gastrocnemius and soleal----on Heparin, vascular following  -Multiple myeloma --Chemotherapy-- followed by Dr. Olsen in [place]  -hx of DVT, remote----- IVC filter in place  -CKD III, creatinine 1.6 on admit----renal following  -Anemia of chronic disease    Plan for mechanical thrombectomy LLE today, a/c per vascular.  IVFs prior to procedure for renal protection.     Meeat Bello, APRN  02/01/21  10:06 EST

## 2021-02-01 NOTE — PROGRESS NOTES
Patient states that his left leg feels considerably better since he has been started on heparin and with bed rest.    Left calf measure 6 cm larger than the right.  Calf more firm than the right but not tender.  Palpable pulses noted.    Creatinine today 1.48, down from 1.6 on admission.    Discussed mechanical thrombectomy with the patient.  Risk and benefits have been discussed.  He does have a vena cava filter present which should be protective.  He is in agreement with the plan.  We will proceed with this later today.

## 2021-02-01 NOTE — PLAN OF CARE
Goal Outcome Evaluation:    Pt scheduled for thrombectomy today for acute DVT. Pt NPO, consent signed in the chart, heparin infusion continues, pending next PTT. VSS, pain adequately treated, will continue to monitor

## 2021-02-01 NOTE — CONSULTS
Met with patient, discussed benefits of cardiac rehab. Provided phase II information along with the contact information for cardiac rehab  at [facility]. Patient state would call when he is ready to attend..

## 2021-02-01 NOTE — PROGRESS NOTES
"Patient Care Team:  Adolph Christensen MD as PCP - General (Internal Medicine)    Chief Complaint: Follow-up left main stent, CKD    Interval History:   No chest pain.  Resting comfortably.    Objective   Vital Signs  Temp:  [97.4 °F (36.3 °C)-98.4 °F (36.9 °C)] 98.4 °F (36.9 °C)  Heart Rate:  [59-65] 59  Resp:  [18-20] 18  BP: (112-150)/(50-84) 143/84    Intake/Output Summary (Last 24 hours) at 2/1/2021 1044  Last data filed at 2/1/2021 0804  Gross per 24 hour   Intake 120 ml   Output 200 ml   Net -80 ml     Flowsheet Rows      First Filed Value   Admission Height  198.1 cm (78\") [Simultaneous filing. User may be unaware of other data.] Documented at 01/28/2021 2357   Admission Weight  91.9 kg (202 lb 11.2 oz) Documented at 01/28/2021 2201          General Appearance:    Alert, cooperative, in no acute distress   Head:    Normocephalic, without obvious abnormality, atraumatic       Neck:   No adenopathy, supple, no thyromegaly, no carotid bruit, no    JVD   Lungs:     Clear to auscultation bilaterally, no wheezes, rales, or     rhonchi    Heart:    Normal rate, regular rhythm, no murmur, no rub, no gallop   Chest Wall:    No abnormalities observed   Abdomen:     Normal bowel sounds, soft, nontender, nondistended,            no rebound tenderness   Extremities:   No cyanosis, clubbing.  2+ left lower extremity edema   Pulses:   Pulses palpable and equal bilaterally   Skin:   No bleeding or rash       Neurologic:   Cranial nerves 2 - 12 grossly intact, sensation intact           aspirin, 81 mg, Oral, Daily  atorvastatin, 10 mg, Oral, Nightly  ceFAZolin, 2 g, Intravenous, 30 Min Pre-Op  clopidogrel, 75 mg, Oral, Daily  isosorbide mononitrate, 60 mg, Oral, Q24H  metoprolol tartrate, 12.5 mg, Oral, Q12H        heparin (porcine), 16.4 Units/kg/hr, Last Rate: 11.4 Units/kg/hr (02/01/21 0949)  sodium chloride, 75 mL/hr, Last Rate: 75 mL/hr (02/01/21 0925)        Results Review:    Results from last 7 days   Lab Units " 02/01/21  0355   SODIUM mmol/L 136   POTASSIUM mmol/L 4.2   CHLORIDE mmol/L 103   CO2 mmol/L 22.8   BUN mg/dL 26*   CREATININE mg/dL 1.48*   GLUCOSE mg/dL 101*   CALCIUM mg/dL 8.7         Results from last 7 days   Lab Units 02/01/21  0355   WBC 10*3/mm3 7.93   HEMOGLOBIN g/dL 8.2*   HEMATOCRIT % 25.5*   PLATELETS 10*3/mm3 125*     Results from last 7 days   Lab Units 02/01/21  0909 02/01/21  0355 02/01/21  0014 01/31/21  1539  01/28/21  2342   INR   --   --   --  1.28*  --  1.08   APTT seconds 107.0* 125.5* 168.7* 26.9   < > <20.0*    < > = values in this interval not displayed.     Results from last 7 days   Lab Units 01/30/21  0411   CHOLESTEROL mg/dL 111     Results from last 7 days   Lab Units 02/01/21  0355   MAGNESIUM mg/dL 2.5*     Results from last 7 days   Lab Units 01/30/21  0411   CHOLESTEROL mg/dL 111   TRIGLYCERIDES mg/dL 244*   HDL CHOL mg/dL 27*   LDL CHOL mg/dL 45       I reviewed the patient's new clinical results.  I personally viewed and interpreted the patient's EKG/Telemetry data        Assessment/Plan   1.  Coronary artery disease, multivessel.  Left main stent complicated by perforation sealed up with balloon tamponade.  2.  CKD  3.  Dementia    4.  DVT      -Agree with continuing heparin therapy.  Continue Plavix.  I would not recommend triple therapy in this case.  -Renal function appears better.  -Therapy for DVT with extraction today by vascular.  Should be able to go over to a direct oral anticoagulant tomorrow or the next day depending on their approach

## 2021-02-01 NOTE — PROGRESS NOTES
"   LOS: 4 days    Patient Care Team:  Adolph Christensen MD as PCP - General (Internal Medicine)    Chief Complaint:  No chief complaint on file.    Follow-up renal dysfunction probably CKD stage III  Subjective     Interval History:     Seen and examined.  No shortness of air or chest pain. New DVT and he is On heparin drip. On IVF.  Plan for thrombectomy       Review of Systems:   As noted above    Objective     Vital Signs  Temp:  [97.4 °F (36.3 °C)-98.4 °F (36.9 °C)] 98 °F (36.7 °C)  Heart Rate:  [56-65] 59  Resp:  [18-20] 18  BP: (112-150)/(50-87) 150/83    Flowsheet Rows      First Filed Value   Admission Height  198.1 cm (78\") [Simultaneous filing. User may be unaware of other data.] Documented at 01/28/2021 2357   Admission Weight  91.9 kg (202 lb 11.2 oz) Documented at 01/28/2021 2201          No intake/output data recorded.  I/O last 3 completed shifts:  In: 120 [P.O.:120]  Out: 575 [Urine:575]    Intake/Output Summary (Last 24 hours) at 2/1/2021 0733  Last data filed at 1/31/2021 2310  Gross per 24 hour   Intake 120 ml   Output 200 ml   Net -80 ml       Physical Exam:  General Appearance: alert, oriented x 3, no acute distress, obese  Skin: warm and dry  HEENT: Oral mucosa normal, nonicteric sclera  Neck: supple, no JVD, trachea midline  Lungs: CTA, unlabored breathing effort  Heart: RRR, normal S1 and S2, no S3, no rub  Abdomen: soft, nontender, normoactive bowels  : no palpable bladder,  Extremities: 1-2+ left lower extremity edema which is chronic, no cyanosis or clubbing, no right ankle edema  Neuro: normal speech and mental status       Results Review:    Results from last 7 days   Lab Units 02/01/21  0355 01/31/21  0416 01/30/21  0411 01/28/21  2342   SODIUM mmol/L 136 137 138 140   POTASSIUM mmol/L 4.2 3.8 3.6 4.0   CHLORIDE mmol/L 103 103 105 104   CO2 mmol/L 22.8 21.4* 23.0 24.5   BUN mg/dL 26* 26* 27* 33*   CREATININE mg/dL 1.48* 1.44* 1.59* 1.60*   CALCIUM mg/dL 8.7 8.7 8.3* 8.6   BILIRUBIN " mg/dL  --   --   --  0.3   ALK PHOS U/L  --   --   --  53   ALT (SGPT) U/L  --   --   --  18   AST (SGOT) U/L  --   --   --  19   GLUCOSE mg/dL 101* 94 105* 99       Estimated Creatinine Clearance: 48.6 mL/min (A) (by C-G formula based on SCr of 1.48 mg/dL (H)).    Results from last 7 days   Lab Units 02/01/21  0355 01/31/21  0416 01/30/21  0411 01/28/21  2342   MAGNESIUM mg/dL 2.5* 2.5*  --  2.2   PHOSPHORUS mg/dL 4.0 3.5 3.4  --        Results from last 7 days   Lab Units 02/01/21  0355 01/31/21  0416   URIC ACID mg/dL 8.2* 8.1*       Results from last 7 days   Lab Units 02/01/21  0355 01/31/21  1539 01/31/21  0416 01/30/21  0411 01/29/21  0032   WBC 10*3/mm3 7.93 9.08 6.36 6.13 9.40   HEMOGLOBIN g/dL 8.2* 8.8* 8.4* 8.0* 8.9*   PLATELETS 10*3/mm3 125* 134* 131* 147 174       Results from last 7 days   Lab Units 01/31/21  1539 01/28/21  2342   INR  1.28* 1.08         Imaging Results (Last 24 Hours)     Procedure Component Value Units Date/Time    XR Abdomen KUB [[id number]] Collected: 01/31/21 1721     Updated: 01/31/21 1721    Narrative:        Patient: JOSE ANTONIO GARCIA  Time Out: 17:21  Exam(s): FILM ABDOMEN     EXAM:    XR Abdomen, 2 Views    CLINICAL HISTORY:     Preoperative evaluation for surgery tomorrow, history vena cava filter   placement and removal  Reason for exam: Preoperative evaluation for   surgery tomorrow, history vena cava filter placement and removal. Reason   for Exam:->Preoperative evaluation for surgery tomorrow, history vena   cava filter placement and removal. Portable->Yes.. Additional notes: 2   images    TECHNIQUE:    Frontal view of the abdomen pelvis with upright view of the abdomen.    COMPARISON:    None    FINDINGS:    Hardware: IVC filter noted.      Abdomen: Nonobstructive bowel gas pattern. No free air.      Bones: Normal.      Soft tissues: Normal.    IMPRESSION:       Nonobstructive bowel gas pattern.      Impression:          Electronically signed by Keenan Koenig M.D. on  01-31-21 at 1721        aspirin, 81 mg, Oral, Daily  atorvastatin, 10 mg, Oral, Nightly  ceFAZolin, 2 g, Intravenous, 30 Min Pre-Op  clopidogrel, 75 mg, Oral, Daily  isosorbide mononitrate, 60 mg, Oral, Q24H  metoprolol tartrate, 12.5 mg, Oral, Q12H      heparin (porcine), 16.4 Units/kg/hr, Last Rate: 13.4 Units/kg/hr (02/01/21 0223)  sodium chloride, 75 mL/hr, Last Rate: 75 mL/hr (01/31/21 1819)        Medication Review:   Current Facility-Administered Medications   Medication Dose Route Frequency Provider Last Rate Last Admin   • acetaminophen (TYLENOL) tablet 650 mg  650 mg Oral Q4H PRN Jr Deandre Golden MD   650 mg at 01/31/21 1312   • aspirin chewable tablet 81 mg  81 mg Oral Daily Jr Deandre Golden MD   81 mg at 01/31/21 1015   • atorvastatin (LIPITOR) tablet 10 mg  10 mg Oral Nightly Jr Deandre Golden MD   10 mg at 01/31/21 2050   • ceFAZolin in dextrose (ANCEF) IVPB solution 2 g  2 g Intravenous 30 Min Pre-Op Richy Childress MD       • clopidogrel (PLAVIX) tablet 75 mg  75 mg Oral Daily Jr Deandre Golden MD   75 mg at 01/31/21 1015   • heparin (porcine) 5000 UNIT/ML injection 3,600-7,300 Units  40-80 Units/kg Intravenous Q6H PRN Jr Deandre Golden MD       • heparin 71766 units/250 mL (100 units/mL) in 0.45 % NaCl infusion  16.4 Units/kg/hr Intravenous Titrated Jr Deandre Golden MD 12.22 mL/hr at 02/01/21 0223 13.4 Units/kg/hr at 02/01/21 0223   • HYDROcodone-acetaminophen (NORCO) 5-325 MG per tablet 1 tablet  1 tablet Oral Q4H PRN Jr Deandre Golden MD   1 tablet at 01/31/21 1447   • isosorbide mononitrate (IMDUR) 24 hr tablet 60 mg  60 mg Oral Q24H Jr Deandre Golden MD   60 mg at 01/31/21 1015   • metoprolol tartrate (LOPRESSOR) tablet 12.5 mg  12.5 mg Oral Q12H Jr Deandre Golden MD   12.5 mg at 01/31/21 2050   • morphine injection 1 mg  1 mg Intravenous Q4H PRN Jr Deandre Golden MD   1 mg at 01/31/21 1600    And   • naloxone (NARCAN) injection 0.4 mg  0.4 mg  Intravenous Q5 Min PRN Jr Deandre Golden MD       • ondansetron (ZOFRAN) tablet 4 mg  4 mg Oral Q6H PRN Jr Deandre Golden MD        Or   • ondansetron (ZOFRAN) injection 4 mg  4 mg Intravenous Q6H PRN Jr Deandre Golden MD   4 mg at 01/31/21 1522   • sodium chloride 0.9 % infusion  75 mL/hr Intravenous Continuous Richy Childress MD 75 mL/hr at 01/31/21 1819 75 mL/hr at 01/31/21 1819   • temazepam (RESTORIL) capsule 15 mg  15 mg Oral Nightly PRN Jr Deandre Golden MD           Assessment/Plan   1.  CKD stage III with background of multiple myeloma his protein to creatinine ratio was 122.7 mg/g  2.  Coronary artery disease with 3 vessel disease and underwent PCI and stents of all 3 vessels  3.  NSTEMI  4.  Multiple myeloma  5.  Anemia, hemoglobin is 8.2.  6.  Chronic anticoagulation for prior DVT/PE  7. Acute left lower extremity deep vein thrombosis noted in the external iliac, common femoral, deep femoral, proximal femoral, mid femoral, distal femoral, popliteal, posterial tibial, peroneal, gastrocnemius and soleal.      Plan:    1.  Continue IVF for now in anticipation of thrombectomy today.  Right need to start to diurese tomorrow  2.  Continue heparin drip.  Plan for thrombectomy  3.  Surveillance labs          Elton Lemos MD  02/01/21  07:33 EST

## 2021-02-02 LAB
ALBUMIN SERPL-MCNC: 2.9 G/DL (ref 3.5–5.2)
ALBUMIN/GLOB SERPL: 0.5 G/DL
ALP SERPL-CCNC: 48 U/L (ref 39–117)
ALT SERPL W P-5'-P-CCNC: 12 U/L (ref 1–41)
ANION GAP SERPL CALCULATED.3IONS-SCNC: 11.4 MMOL/L (ref 5–15)
APTT PPP: 59 SECONDS (ref 22.7–35.4)
AST SERPL-CCNC: 11 U/L (ref 1–40)
BASOPHILS # BLD AUTO: 0.01 10*3/MM3 (ref 0–0.2)
BASOPHILS NFR BLD AUTO: 0.1 % (ref 0–1.5)
BILIRUB SERPL-MCNC: 0.4 MG/DL (ref 0–1.2)
BUN SERPL-MCNC: 22 MG/DL (ref 8–23)
BUN/CREAT SERPL: 13.5 (ref 7–25)
CALCIUM SPEC-SCNC: 8.4 MG/DL (ref 8.6–10.5)
CHLORIDE SERPL-SCNC: 104 MMOL/L (ref 98–107)
CO2 SERPL-SCNC: 22.6 MMOL/L (ref 22–29)
CREAT SERPL-MCNC: 1.63 MG/DL (ref 0.76–1.27)
DEPRECATED RDW RBC AUTO: 60 FL (ref 37–54)
EOSINOPHIL # BLD AUTO: 0.02 10*3/MM3 (ref 0–0.4)
EOSINOPHIL NFR BLD AUTO: 0.3 % (ref 0.3–6.2)
ERYTHROCYTE [DISTWIDTH] IN BLOOD BY AUTOMATED COUNT: 16.8 % (ref 12.3–15.4)
GFR SERPL CREATININE-BSD FRML MDRD: 41 ML/MIN/1.73
GLOBULIN UR ELPH-MCNC: 5.6 GM/DL
GLUCOSE SERPL-MCNC: 113 MG/DL (ref 65–99)
HCT VFR BLD AUTO: 23.7 % (ref 37.5–51)
HGB BLD-MCNC: 7.8 G/DL (ref 13–17.7)
IMM GRANULOCYTES # BLD AUTO: 0.07 10*3/MM3 (ref 0–0.05)
IMM GRANULOCYTES NFR BLD AUTO: 0.9 % (ref 0–0.5)
LYMPHOCYTES # BLD AUTO: 1.6 10*3/MM3 (ref 0.7–3.1)
LYMPHOCYTES NFR BLD AUTO: 20.8 % (ref 19.6–45.3)
MCH RBC QN AUTO: 32 PG (ref 26.6–33)
MCHC RBC AUTO-ENTMCNC: 32.9 G/DL (ref 31.5–35.7)
MCV RBC AUTO: 97.1 FL (ref 79–97)
MONOCYTES # BLD AUTO: 0.86 10*3/MM3 (ref 0.1–0.9)
MONOCYTES NFR BLD AUTO: 11.2 % (ref 5–12)
NEUTROPHILS NFR BLD AUTO: 5.15 10*3/MM3 (ref 1.7–7)
NEUTROPHILS NFR BLD AUTO: 66.7 % (ref 42.7–76)
NRBC BLD AUTO-RTO: 0.1 /100 WBC (ref 0–0.2)
PLATELET # BLD AUTO: 122 10*3/MM3 (ref 140–450)
PMV BLD AUTO: 11.8 FL (ref 6–12)
POTASSIUM SERPL-SCNC: 4 MMOL/L (ref 3.5–5.2)
PROT SERPL-MCNC: 8.5 G/DL (ref 6–8.5)
RBC # BLD AUTO: 2.44 10*6/MM3 (ref 4.14–5.8)
SODIUM SERPL-SCNC: 138 MMOL/L (ref 136–145)
WBC # BLD AUTO: 7.71 10*3/MM3 (ref 3.4–10.8)

## 2021-02-02 PROCEDURE — 99232 SBSQ HOSP IP/OBS MODERATE 35: CPT | Performed by: PHYSICIAN ASSISTANT

## 2021-02-02 PROCEDURE — 80053 COMPREHEN METABOLIC PANEL: CPT | Performed by: INTERNAL MEDICINE

## 2021-02-02 PROCEDURE — 85025 COMPLETE CBC W/AUTO DIFF WBC: CPT | Performed by: SURGERY

## 2021-02-02 PROCEDURE — 85730 THROMBOPLASTIN TIME PARTIAL: CPT | Performed by: THORACIC SURGERY (CARDIOTHORACIC VASCULAR SURGERY)

## 2021-02-02 PROCEDURE — 99232 SBSQ HOSP IP/OBS MODERATE 35: CPT | Performed by: NURSE PRACTITIONER

## 2021-02-02 RX ORDER — TORSEMIDE 20 MG/1
40 TABLET ORAL DAILY
Status: DISCONTINUED | OUTPATIENT
Start: 2021-02-02 | End: 2021-02-04 | Stop reason: HOSPADM

## 2021-02-02 RX ADMIN — TORSEMIDE 40 MG: 20 TABLET ORAL at 12:39

## 2021-02-02 RX ADMIN — ATORVASTATIN CALCIUM 10 MG: 20 TABLET, FILM COATED ORAL at 21:43

## 2021-02-02 RX ADMIN — METOPROLOL TARTRATE 25 MG: 25 TABLET, FILM COATED ORAL at 21:43

## 2021-02-02 RX ADMIN — APIXABAN 10 MG: 5 TABLET, FILM COATED ORAL at 11:19

## 2021-02-02 RX ADMIN — ISOSORBIDE MONONITRATE 60 MG: 60 TABLET ORAL at 09:33

## 2021-02-02 RX ADMIN — CLOPIDOGREL 75 MG: 75 TABLET, FILM COATED ORAL at 09:33

## 2021-02-02 RX ADMIN — APIXABAN 10 MG: 5 TABLET, FILM COATED ORAL at 21:43

## 2021-02-02 RX ADMIN — SODIUM CHLORIDE 100 ML/HR: 9 INJECTION, SOLUTION INTRAVENOUS at 07:35

## 2021-02-02 NOTE — PROGRESS NOTES
The patient is 1 day status post left lower extremity venogram with left iliac vein angioplasty and stent, from bilateral popliteal vein approach.  He is doing satisfactorily with no significant issues.  He states that his leg pain and swelling have totally resolved.  He has been ambulating with no issues.  He currently is on a heparin drip.    Legs were wrapped for compression but appears to be significantly less swollen than compared to preoperatively.    Doing well following left iliac vein angioplasty with stent.  He has had decompression of his venous system with this.  He understands he will need to be on anticoagulation lifelong.  Will defer that to the cardiology service.    We will see him back in the office in 2 months with a venous scan.

## 2021-02-02 NOTE — PROGRESS NOTES
Hospital Follow Up    LOS:  LOS: 5 days   Patient Name: Richy Kong  Age/Sex: 80 y.o. male  : 1940  MRN: [id number]    Day of Service: 21   Length of Stay: 5  Encounter Provider: OLLIE Owens  Place of Service: [facility] CARDIOLOGY  Patient Care Team:  Adolph Christensen MD as PCP - General (Internal Medicine)    Subjective:     Chief Complaint: chest pain/CAD/DVT    Interval History: No complaints today. Wants to go home.     Objective:     Objective:  Temp:  [98 °F (36.7 °C)-98.8 °F (37.1 °C)] 98.8 °F (37.1 °C)  Heart Rate:  [49-68] 68  Resp:  [14-18] 16  BP: (127-166)/(67-98) 162/83     Intake/Output Summary (Last 24 hours) at 2021 1034  Last data filed at 2021 0736  Gross per 24 hour   Intake 1400 ml   Output 2150 ml   Net -750 ml     Body mass index is 22.36 kg/m².      21  0307 21  1255 21  0612   Weight: 91.4 kg (201 lb 8 oz) 91.2 kg (201 lb) 87.8 kg (193 lb 8 oz)     Weight change:     Physical Exam:   General Appearance:    Awake alert and oriented in no acute distress.   Color:  Skin:  Neuro:  HEENT:    Lungs:     Pink  Warm and dry  No focal, motor or sensory deficits  Neck supple, pupils equal, round and reactive. No JVD, No Bruit  Scattered wheezes to auscultation,respirations regular, even and  unlabored    Heart:    Regular rate and rhythm, S1 and S2, no murmur, no gallop, no rub. No edema, DP/PT pulses are 2+. Right radial cath site stable   Chest Wall:    No abnormalities observed   Abdomen:     Normal bowel sounds, no masses, no organomegaly, soft        non-tender, non-distended, no guarding, no ascites noted   Extremities:   Moves all extremities well, no edema, no cyanosis, no redness       Lab Review:   Results from last 7 days   Lab Units 21  0916 21  0355  01/28/21  2342   SODIUM mmol/L 138 136   < > 140   POTASSIUM mmol/L 4.0 4.2   < > 4.0   CHLORIDE mmol/L 104 103   < > 104   CO2 mmol/L 22.6  22.8   < > 24.5   BUN mg/dL 22 26*   < > 33*   CREATININE mg/dL 1.63* 1.48*   < > 1.60*   GLUCOSE mg/dL 113* 101*   < > 99   CALCIUM mg/dL 8.4* 8.7   < > 8.6   AST (SGOT) U/L 11  --   --  19   ALT (SGPT) U/L 12  --   --  18    < > = values in this interval not displayed.         Results from last 7 days   Lab Units 02/02/21  0527 02/01/21  0355   WBC 10*3/mm3 7.71 7.93   HEMOGLOBIN g/dL 7.8* 8.2*   HEMATOCRIT % 23.7* 25.5*   PLATELETS 10*3/mm3 122* 125*     Results from last 7 days   Lab Units 02/02/21  0527 02/01/21  2041  01/31/21  1539  01/28/21  2342   INR   --   --   --  1.28*  --  1.08   APTT seconds 59.0* >200.0*   < > 26.9   < > <20.0*    < > = values in this interval not displayed.     Results from last 7 days   Lab Units 02/01/21  0355 01/31/21  0416   MAGNESIUM mg/dL 2.5* 2.5*     Results from last 7 days   Lab Units 01/30/21  0411 01/28/21  2342   CHOLESTEROL mg/dL 111 141   TRIGLYCERIDES mg/dL 244* 240*   HDL CHOL mg/dL 27* 35*     Results from last 7 days   Lab Units 01/28/21  2342   PROBNP pg/mL 1,571.0         I reviewed the patient's new clinical results.  I personally viewed and interpreted the patient's EKG  Current Medications:   Scheduled Meds:apixaban, 10 mg, Oral, Q12H    Followed by  [START ON 2/9/2021] apixaban, 5 mg, Oral, Q12H  atorvastatin, 10 mg, Oral, Nightly  clopidogrel, 75 mg, Oral, Daily  isosorbide mononitrate, 60 mg, Oral, Q24H  metoprolol tartrate, 25 mg, Oral, Q12H  torsemide, 40 mg, Oral, Daily      Continuous Infusions:     Allergies:  Allergies   Allergen Reactions   • Pravastatin Unknown - Low Severity       Assessment:       Coronary artery disease of native heart with stable angina pectoris (CMS/HCC)    CAD (coronary artery disease)    Asthma    Foot callus    Hypertensive disorder    Acute deep vein thrombosis (DVT) of left iliofemoral vein (CMS/HCC)    1.  Coronary artery disease, multivessel.  Left main stent complicated by perforation sealed up with balloon  tamponade.  2.  CKD  3.  Dementia    4.  DVT- s/'p venogram with left iliac vein stent.    Plan:   Cr a little worse today. Denies SOA but seems a little SOA. Vascular ok with starting anticoagulation. His Hgb is low but it has been. Will start on DVT dose of Eliquis 10mg BID for 7 days then 5mg BID after that. No aspirin due to Elqiuis and continue with Plavix. Echo yesterday was stable no evidence of tamponde and no clinical symptoms today.        OLLIE Owens  02/02/21  10:34 EST  Electronically signed by OLLIE Owens, 02/02/21, 10:34 AM EST.

## 2021-02-02 NOTE — ANESTHESIA POSTPROCEDURE EVALUATION
"Patient: Richy Kong    Procedure Summary     Date: 02/01/21 [facility] / [facility]  [facility]    Anesthesia Start: 1452 Anesthesia Stop: 1828    Procedure: LFT LEG VENOGRAM, left mechanical thrombectomy (Left ) Diagnosis:     Surgeon: Mckenzie Christensen Jr., MD Provider: Arsh Devries MD    Anesthesia Type: general ASA Status: 4          Anesthesia Type: general    Vitals  Vitals Value Taken Time   /90 02/01/21 1915   Temp     Pulse 52 02/01/21 1923   Resp 14 02/01/21 1915   SpO2 95 % 02/01/21 1923   Vitals shown include unvalidated device data.        Post Anesthesia Care and Evaluation    Patient location during evaluation: bedside  Patient participation: complete - patient participated  Level of consciousness: awake and alert  Pain management: adequate  Airway patency: patent  Anesthetic complications: No anesthetic complications    Cardiovascular status: acceptable  Respiratory status: acceptable  Hydration status: acceptable    Comments: /90   Pulse 50   Temp 37 °C (98.6 °F) (Oral)   Resp 14   Ht 198.1 cm (78\")   Wt 87.8 kg (193 lb 8 oz)   SpO2 97%   BMI 22.36 kg/m²           "

## 2021-02-02 NOTE — PROGRESS NOTES
"   LOS: 5 days    Patient Care Team:  Adolph Christensen MD as PCP - General (Internal Medicine)    Chief Complaint:  No chief complaint on file.    Follow-up renal dysfunction probably CKD stage III  Subjective     Interval History:     Seen and examined.  No shortness of air or chest pain.S/P thrombectomy. No new issues      Review of Systems:   As noted above    Objective     Vital Signs  Temp:  [98 °F (36.7 °C)-98.8 °F (37.1 °C)] 98.8 °F (37.1 °C)  Heart Rate:  [49-68] 68  Resp:  [14-18] 16  BP: (127-166)/(67-98) 162/83    Flowsheet Rows      First Filed Value   Admission Height  198.1 cm (78\") [Simultaneous filing. User may be unaware of other data.] Documented at 01/28/2021 2357   Admission Weight  91.9 kg (202 lb 11.2 oz) Documented at 01/28/2021 2201          I/O this shift:  In: 240 [P.O.:240]  Out: -   I/O last 3 completed shifts:  In: 1280 [P.O.:180; I.V.:1100]  Out: 2350 [Urine:2350]    Intake/Output Summary (Last 24 hours) at 2/2/2021 0926  Last data filed at 2/2/2021 0736  Gross per 24 hour   Intake 1400 ml   Output 2150 ml   Net -750 ml       Physical Exam:  General Appearance: alert, oriented x 3, no acute distress, obese  Skin: warm and dry  HEENT: Oral mucosa normal, nonicteric sclera  Neck: supple, no JVD, trachea midline  Lungs: CTA, unlabored breathing effort  Heart: RRR, normal S1 and S2, no S3, no rub  Abdomen: soft, nontender, normoactive bowels  : no palpable bladder,  Extremities: 1-2+ left lower extremity edema which is chronic, no cyanosis or clubbing, no right ankle edema  Neuro: normal speech and mental status       Results Review:    Results from last 7 days   Lab Units 02/01/21  0355 01/31/21  0416 01/30/21  0411 01/28/21  2342   SODIUM mmol/L 136 137 138 140   POTASSIUM mmol/L 4.2 3.8 3.6 4.0   CHLORIDE mmol/L 103 103 105 104   CO2 mmol/L 22.8 21.4* 23.0 24.5   BUN mg/dL 26* 26* 27* 33*   CREATININE mg/dL 1.48* 1.44* 1.59* 1.60*   CALCIUM mg/dL 8.7 8.7 8.3* 8.6   BILIRUBIN mg/dL  " --   --   --  0.3   ALK PHOS U/L  --   --   --  53   ALT (SGPT) U/L  --   --   --  18   AST (SGOT) U/L  --   --   --  19   GLUCOSE mg/dL 101* 94 105* 99       Estimated Creatinine Clearance: 48.6 mL/min (A) (by C-G formula based on SCr of 1.48 mg/dL (H)).    Results from last 7 days   Lab Units 02/01/21  0355 01/31/21  0416 01/30/21  0411 01/28/21  2342   MAGNESIUM mg/dL 2.5* 2.5*  --  2.2   PHOSPHORUS mg/dL 4.0 3.5 3.4  --        Results from last 7 days   Lab Units 02/01/21  0355 01/31/21  0416   URIC ACID mg/dL 8.2* 8.1*       Results from last 7 days   Lab Units 02/02/21  0527 02/01/21  0355 01/31/21  1539 01/31/21 0416 01/30/21  0411   WBC 10*3/mm3 7.71 7.93 9.08 6.36 6.13   HEMOGLOBIN g/dL 7.8* 8.2* 8.8* 8.4* 8.0*   PLATELETS 10*3/mm3 122* 125* 134* 131* 147       Results from last 7 days   Lab Units 01/31/21  1539 01/28/21  2342   INR  1.28* 1.08         Imaging Results (Last 24 Hours)     Procedure Component Value Units Date/Time    Arteriogram (Autofinalize) [[id number]] Resulted: 02/01/21 1838     Updated: 02/01/21 1838    Narrative:      This procedure was auto-finalized with no dictation required.        apixaban, 10 mg, Oral, Q12H    Followed by  [START ON 2/9/2021] apixaban, 5 mg, Oral, Q12H  atorvastatin, 10 mg, Oral, Nightly  clopidogrel, 75 mg, Oral, Daily  isosorbide mononitrate, 60 mg, Oral, Q24H  metoprolol tartrate, 25 mg, Oral, Q12H           Medication Review:   Current Facility-Administered Medications   Medication Dose Route Frequency Provider Last Rate Last Admin   • acetaminophen (TYLENOL) tablet 650 mg  650 mg Oral Q4H PRN Mckenzie Christensen Jr., MD   650 mg at 01/31/21 1312   • apixaban (ELIQUIS) tablet 10 mg  10 mg Oral Q12H Diana Garay APRN        Followed by   • [START ON 2/9/2021] apixaban (ELIQUIS) tablet 5 mg  5 mg Oral Q12H Diana Garay APRN       • atorvastatin (LIPITOR) tablet 10 mg  10 mg Oral Nightly Mckenzie Christensen Jr., MD   10 mg at 01/31/21 2050   •  clopidogrel (PLAVIX) tablet 75 mg  75 mg Oral Daily Mckenzie Christensen Jr., MD   75 mg at 02/01/21 0925   • heparin (porcine) 5000 UNIT/ML injection 3,600-7,300 Units  40-80 Units/kg Intravenous Q6H PRN Mckenzie Christensen Jr., MD       • HYDROcodone-acetaminophen (NORCO) 5-325 MG per tablet 1 tablet  1 tablet Oral Q4H PRN Mckenzie Christensen Jr., MD   1 tablet at 01/31/21 1447   • isosorbide mononitrate (IMDUR) 24 hr tablet 60 mg  60 mg Oral Q24H Mckenzie Christensen Jr., MD   60 mg at 02/01/21 0925   • metoprolol tartrate (LOPRESSOR) tablet 25 mg  25 mg Oral Q12H Diana Garay APRN       • morphine injection 1 mg  1 mg Intravenous Q4H PRN Mckenzie Christensen Jr., MD   1 mg at 01/31/21 1600    And   • naloxone (NARCAN) injection 0.4 mg  0.4 mg Intravenous Q5 Min PRN Mckenzie Christensen Jr., MD       • ondansetron (ZOFRAN) tablet 4 mg  4 mg Oral Q6H PRN Mckenzie Christensen Jr., MD        Or   • ondansetron (ZOFRAN) injection 4 mg  4 mg Intravenous Q6H PRN Mckenzie Christensen Jr., MD   4 mg at 01/31/21 1522   • temazepam (RESTORIL) capsule 15 mg  15 mg Oral Nightly PRN Mckenzie Christensen Jr., MD           Assessment/Plan   1.  CKD stage III with background of multiple myeloma his protein to creatinine ratio was 122.7 mg/g  2.  Coronary artery disease with 3 vessel disease and underwent PCI and stents of all 3 vessels  3.  NSTEMI  4.  Multiple myeloma  5.  Anemia, hemoglobin is 8.2.  6.  Chronic anticoagulation for prior DVT/PE  7. Acute left lower extremity deep vein thrombosis noted in the external iliac, common femoral, deep femoral, proximal femoral, mid femoral, distal femoral, popliteal, posterial tibial, peroneal, gastrocnemius and soleal. S/P stent placement on the left yesterday  Plan:    1.  Start Torsemide 40 mg po daily  2.  Surveillance labs      Elton Lemos MD  02/02/21  09:26 EST

## 2021-02-02 NOTE — PROGRESS NOTES
" LOS: 5 days   Patient Care Team:  Adolph Christensen MD as PCP - General (Internal Medicine)    Chief Complaint: CAD    Subjective  No current complaints. States the pain and swelling in his legs have completely resolved. Reports feeling great and wants to go home. States his only complaint is that the hospital bed isn't very comfortable.     Vital Signs  Temp:  [98 °F (36.7 °C)-98.8 °F (37.1 °C)] 98.8 °F (37.1 °C)  Heart Rate:  [49-68] 68  Resp:  [14-18] 16  BP: (127-166)/(67-98) 162/83  Body mass index is 22.36 kg/m².    Intake/Output Summary (Last 24 hours) at 2/2/2021 0840  Last data filed at 2/2/2021 0736  Gross per 24 hour   Intake 1400 ml   Output 2150 ml   Net -750 ml     I/O this shift:  In: 240 [P.O.:240]  Out: -             01/30/21  0307 01/30/21  1255 02/01/21  0612   Weight: 91.4 kg (201 lb 8 oz) 91.2 kg (201 lb) 87.8 kg (193 lb 8 oz)         Objective:  General Appearance:  Comfortable (Sitting up in bed watching tv).    Vital signs: (most recent): Blood pressure 162/83, pulse 68, temperature 98.8 °F (37.1 °C), temperature source Oral, resp. rate 16, height 198.1 cm (78\"), weight 87.8 kg (193 lb 8 oz), SpO2 96 %.  Vital signs are normal.    Lungs:  Normal effort and normal respiratory rate.    Heart: Normal rate.  Regular rhythm.    Extremities: There is dependent edema (1+ pretibial left leg).  (Right leg wrapped in ACE bandage)  Neurological: Patient is alert and oriented to person, place and time.    Skin:  Warm and dry.              Results Review:        WBC WBC   Date Value Ref Range Status   02/02/2021 7.71 3.40 - 10.80 10*3/mm3 Final   02/01/2021 7.93 3.40 - 10.80 10*3/mm3 Final   01/31/2021 9.08 3.40 - 10.80 10*3/mm3 Final   01/31/2021 6.36 3.40 - 10.80 10*3/mm3 Final      HGB Hemoglobin   Date Value Ref Range Status   02/02/2021 7.8 (L) 13.0 - 17.7 g/dL Final   02/01/2021 8.2 (L) 13.0 - 17.7 g/dL Final   01/31/2021 8.8 (L) 13.0 - 17.7 g/dL Final   01/31/2021 8.4 (L) 13.0 - 17.7 g/dL Final "      HCT Hematocrit   Date Value Ref Range Status   02/02/2021 23.7 (L) 37.5 - 51.0 % Final   02/01/2021 25.5 (L) 37.5 - 51.0 % Final   01/31/2021 25.7 (L) 37.5 - 51.0 % Final   01/31/2021 25.1 (L) 37.5 - 51.0 % Final      Platelets Platelets   Date Value Ref Range Status   02/02/2021 122 (L) 140 - 450 10*3/mm3 Final   02/01/2021 125 (L) 140 - 450 10*3/mm3 Final   01/31/2021 134 (L) 140 - 450 10*3/mm3 Final   01/31/2021 131 (L) 140 - 450 10*3/mm3 Final        PT/INR:    Protime   Date Value Ref Range Status   01/31/2021 15.8 (H) 11.7 - 14.2 Seconds Final   /  INR   Date Value Ref Range Status   01/31/2021 1.28 (H) 0.90 - 1.10 Final       Sodium Sodium   Date Value Ref Range Status   02/01/2021 136 136 - 145 mmol/L Final   01/31/2021 137 136 - 145 mmol/L Final      Potassium Potassium   Date Value Ref Range Status   02/01/2021 4.2 3.5 - 5.2 mmol/L Final   01/31/2021 3.8 3.5 - 5.2 mmol/L Final      Chloride Chloride   Date Value Ref Range Status   02/01/2021 103 98 - 107 mmol/L Final   01/31/2021 103 98 - 107 mmol/L Final      Bicarbonate CO2   Date Value Ref Range Status   02/01/2021 22.8 22.0 - 29.0 mmol/L Final   01/31/2021 21.4 (L) 22.0 - 29.0 mmol/L Final      BUN BUN   Date Value Ref Range Status   02/01/2021 26 (H) 8 - 23 mg/dL Final   01/31/2021 26 (H) 8 - 23 mg/dL Final      Creatinine Creatinine   Date Value Ref Range Status   02/01/2021 1.48 (H) 0.76 - 1.27 mg/dL Final   01/31/2021 1.44 (H) 0.76 - 1.27 mg/dL Final      Calcium Calcium   Date Value Ref Range Status   02/01/2021 8.7 8.6 - 10.5 mg/dL Final   01/31/2021 8.7 8.6 - 10.5 mg/dL Final      Magnesium Magnesium   Date Value Ref Range Status   02/01/2021 2.5 (H) 1.6 - 2.4 mg/dL Final   01/31/2021 2.5 (H) 1.6 - 2.4 mg/dL Final      Troponin No results found for: TROPONIN   BNP No results found for: BNP         atorvastatin, 10 mg, Oral, Nightly  clopidogrel, 75 mg, Oral, Daily  isosorbide mononitrate, 60 mg, Oral, Q24H  metoprolol tartrate, 12.5 mg,  Oral, Q12H      heparin (porcine), 16.4 Units/kg/hr, Last Rate: 8.4 Units/kg/hr (02/01/21 2332)  lactated ringers, 9 mL/hr, Last Rate: 9 mL/hr (02/01/21 1430)  sodium chloride, 75 mL/hr, Last Rate: Stopped (02/01/21 1822)  sodium chloride, 100 mL/hr, Last Rate: 100 mL/hr (02/02/21 0735)        PRN Meds:.•  acetaminophen  •  heparin (porcine)  •  HYDROcodone-acetaminophen  •  Morphine **AND** naloxone  •  ondansetron **OR** ondansetron  •  temazepam    Patient Active Problem List   Diagnosis Code   • Coronary artery disease of native heart with stable angina pectoris (CMS/Formerly [facility]) I25.118   • CAD (coronary artery disease) I25.10   • Asthma J45.909   • Foot callus L84   • Hypercholesterolemia E78.00   • Hypertensive disorder I10   • Acute deep vein thrombosis (DVT) of left iliofemoral vein (CMS/Formerly [facility]) I82.422       Assessment & Plan    -NSTEMI (Dr. Apolinar Marin), severe multivessel CAD s/p complex PCI LM, LAD, and Lcx----complicated by left main dissection (sealed), no effusion on echo (Semder), on Plavix  -DVT, acute, LLE external iliac, common femoral, deep femoral, proximal femoral, mid femoral, distal femoral, popliteal, posterial tibial, peroneal, gastrocnemius and soleal POD #1 s/p thrombectomy----on Heparin, vascular following  -Multiple myeloma --Chemotherapy-- followed by Dr. Olsen in [place]  -hx of DVT, remote----- IVC filter in place  -CKD III, creatinine 1.6 on admit----renal following  -Anemia of chronic disease      Hopefully transition IV Heparin to oral anticoagulation (Eliquis??) today if ok with all services. Hgb 7.8, but patient asymptomatic, so will just monitor for now. Could likely benefit from some diuretics. Will check BMP to monitor creatinine before ordering diuretics.       RORY Gay  02/02/21  08:40 EST

## 2021-02-03 LAB
ALBUMIN SERPL-MCNC: 3 G/DL (ref 3.5–5.2)
ALBUMIN/GLOB SERPL: 0.5 G/DL
ALP SERPL-CCNC: 51 U/L (ref 39–117)
ALT SERPL W P-5'-P-CCNC: 10 U/L (ref 1–41)
ANION GAP SERPL CALCULATED.3IONS-SCNC: 12.7 MMOL/L (ref 5–15)
APTT PPP: 35.6 SECONDS (ref 22.7–35.4)
AST SERPL-CCNC: 11 U/L (ref 1–40)
BASOPHILS # BLD AUTO: 0.02 10*3/MM3 (ref 0–0.2)
BASOPHILS NFR BLD AUTO: 0.3 % (ref 0–1.5)
BILIRUB SERPL-MCNC: 0.4 MG/DL (ref 0–1.2)
BUN SERPL-MCNC: 27 MG/DL (ref 8–23)
BUN/CREAT SERPL: 14.8 (ref 7–25)
CALCIUM SPEC-SCNC: 8.3 MG/DL (ref 8.6–10.5)
CHLORIDE SERPL-SCNC: 102 MMOL/L (ref 98–107)
CO2 SERPL-SCNC: 25.3 MMOL/L (ref 22–29)
CREAT SERPL-MCNC: 1.83 MG/DL (ref 0.76–1.27)
DEPRECATED RDW RBC AUTO: 55.7 FL (ref 37–54)
EOSINOPHIL # BLD AUTO: 0.38 10*3/MM3 (ref 0–0.4)
EOSINOPHIL NFR BLD AUTO: 6.1 % (ref 0.3–6.2)
ERYTHROCYTE [DISTWIDTH] IN BLOOD BY AUTOMATED COUNT: 16.5 % (ref 12.3–15.4)
GFR SERPL CREATININE-BSD FRML MDRD: 36 ML/MIN/1.73
GLOBULIN UR ELPH-MCNC: 5.9 GM/DL
GLUCOSE SERPL-MCNC: 92 MG/DL (ref 65–99)
HCT VFR BLD AUTO: 22.7 % (ref 37.5–51)
HGB BLD-MCNC: 7.6 G/DL (ref 13–17.7)
IMM GRANULOCYTES # BLD AUTO: 0.05 10*3/MM3 (ref 0–0.05)
IMM GRANULOCYTES NFR BLD AUTO: 0.8 % (ref 0–0.5)
LYMPHOCYTES # BLD AUTO: 1.59 10*3/MM3 (ref 0.7–3.1)
LYMPHOCYTES NFR BLD AUTO: 25.7 % (ref 19.6–45.3)
MCH RBC QN AUTO: 31.4 PG (ref 26.6–33)
MCHC RBC AUTO-ENTMCNC: 33.5 G/DL (ref 31.5–35.7)
MCV RBC AUTO: 93.8 FL (ref 79–97)
MONOCYTES # BLD AUTO: 0.91 10*3/MM3 (ref 0.1–0.9)
MONOCYTES NFR BLD AUTO: 14.7 % (ref 5–12)
NEUTROPHILS NFR BLD AUTO: 3.23 10*3/MM3 (ref 1.7–7)
NEUTROPHILS NFR BLD AUTO: 52.4 % (ref 42.7–76)
NRBC BLD AUTO-RTO: 0.2 /100 WBC (ref 0–0.2)
PLATELET # BLD AUTO: 150 10*3/MM3 (ref 140–450)
PMV BLD AUTO: 11.3 FL (ref 6–12)
POTASSIUM SERPL-SCNC: 3.8 MMOL/L (ref 3.5–5.2)
PROT SERPL-MCNC: 8.9 G/DL (ref 6–8.5)
RBC # BLD AUTO: 2.42 10*6/MM3 (ref 4.14–5.8)
SODIUM SERPL-SCNC: 140 MMOL/L (ref 136–145)
WBC # BLD AUTO: 6.18 10*3/MM3 (ref 3.4–10.8)

## 2021-02-03 PROCEDURE — 85025 COMPLETE CBC W/AUTO DIFF WBC: CPT | Performed by: SURGERY

## 2021-02-03 PROCEDURE — 80053 COMPREHEN METABOLIC PANEL: CPT | Performed by: INTERNAL MEDICINE

## 2021-02-03 PROCEDURE — 86923 COMPATIBILITY TEST ELECTRIC: CPT

## 2021-02-03 PROCEDURE — 99232 SBSQ HOSP IP/OBS MODERATE 35: CPT | Performed by: INTERNAL MEDICINE

## 2021-02-03 PROCEDURE — 86900 BLOOD TYPING SEROLOGIC ABO: CPT

## 2021-02-03 PROCEDURE — 36430 TRANSFUSION BLD/BLD COMPNT: CPT

## 2021-02-03 PROCEDURE — P9016 RBC LEUKOCYTES REDUCED: HCPCS

## 2021-02-03 PROCEDURE — 99232 SBSQ HOSP IP/OBS MODERATE 35: CPT | Performed by: NURSE PRACTITIONER

## 2021-02-03 PROCEDURE — 85730 THROMBOPLASTIN TIME PARTIAL: CPT | Performed by: SURGERY

## 2021-02-03 RX ADMIN — CLOPIDOGREL 75 MG: 75 TABLET, FILM COATED ORAL at 08:50

## 2021-02-03 RX ADMIN — METOPROLOL TARTRATE 25 MG: 25 TABLET, FILM COATED ORAL at 08:50

## 2021-02-03 RX ADMIN — METOPROLOL TARTRATE 25 MG: 25 TABLET, FILM COATED ORAL at 20:12

## 2021-02-03 RX ADMIN — TORSEMIDE 40 MG: 20 TABLET ORAL at 08:50

## 2021-02-03 RX ADMIN — ISOSORBIDE MONONITRATE 60 MG: 60 TABLET ORAL at 08:50

## 2021-02-03 RX ADMIN — APIXABAN 10 MG: 5 TABLET, FILM COATED ORAL at 08:50

## 2021-02-03 RX ADMIN — ATORVASTATIN CALCIUM 10 MG: 20 TABLET, FILM COATED ORAL at 20:12

## 2021-02-03 NOTE — PROGRESS NOTES
"   LOS: 6 days    Patient Care Team:  Adolph Christensen MD as PCP - General (Internal Medicine)    Chief Complaint:  No chief complaint on file.    Follow-up renal dysfunction probably CKD stage III  Subjective     Interval History:     Seen and examined.  No shortness of air or chest pain.S/P thrombectomy. receiving blood transfusion      Review of Systems:   As noted above    Objective     Vital Signs  Temp:  [97.1 °F (36.2 °C)-98.7 °F (37.1 °C)] 97.9 °F (36.6 °C)  Heart Rate:  [58-71] 68  Resp:  [15-18] 18  BP: (118-152)/(65-88) 119/88    Flowsheet Rows      First Filed Value   Admission Height  198.1 cm (78\") [Simultaneous filing. User may be unaware of other data.] Documented at 01/28/2021 2357   Admission Weight  91.9 kg (202 lb 11.2 oz) Documented at 01/28/2021 2201          I/O this shift:  In: 391.7 [P.O.:240; Blood:151.7]  Out: 300 [Urine:300]  I/O last 3 completed shifts:  In: 720 [P.O.:720]  Out: 4190 [Urine:4190]    Intake/Output Summary (Last 24 hours) at 2/3/2021 1419  Last data filed at 2/3/2021 1415  Gross per 24 hour   Intake 571.67 ml   Output 2340 ml   Net -1768.33 ml       Physical Exam:  General Appearance: alert, oriented x 3, no acute distress, obese  Skin: warm and dry  HEENT: Oral mucosa normal, nonicteric sclera  Neck: supple, no JVD, trachea midline  Lungs: CTA, unlabored breathing effort  Heart: RRR, normal S1 and S2, no S3, no rub  Abdomen: soft, nontender, normoactive bowels  : no palpable bladder,  Extremities: 1-2+ left lower extremity edema which is chronic, no cyanosis or clubbing, no right ankle edema  Neuro: normal speech and mental status       Results Review:    Results from last 7 days   Lab Units 02/03/21  0501 02/02/21  0916 02/01/21  0355  01/28/21  2342   SODIUM mmol/L 140 138 136   < > 140   POTASSIUM mmol/L 3.8 4.0 4.2   < > 4.0   CHLORIDE mmol/L 102 104 103   < > 104   CO2 mmol/L 25.3 22.6 22.8   < > 24.5   BUN mg/dL 27* 22 26*   < > 33*   CREATININE mg/dL 1.83* " 1.63* 1.48*   < > 1.60*   CALCIUM mg/dL 8.3* 8.4* 8.7   < > 8.6   BILIRUBIN mg/dL 0.4 0.4  --   --  0.3   ALK PHOS U/L 51 48  --   --  53   ALT (SGPT) U/L 10 12  --   --  18   AST (SGOT) U/L 11 11  --   --  19   GLUCOSE mg/dL 92 113* 101*   < > 99    < > = values in this interval not displayed.       Estimated Creatinine Clearance: 39.6 mL/min (A) (by C-G formula based on SCr of 1.83 mg/dL (H)).    Results from last 7 days   Lab Units 02/01/21  0355 01/31/21  0416 01/30/21  0411 01/28/21  2342   MAGNESIUM mg/dL 2.5* 2.5*  --  2.2   PHOSPHORUS mg/dL 4.0 3.5 3.4  --        Results from last 7 days   Lab Units 02/01/21  0355 01/31/21  0416   URIC ACID mg/dL 8.2* 8.1*       Results from last 7 days   Lab Units 02/03/21  0501 02/02/21  0527 02/01/21  0355 01/31/21  1539 01/31/21  0416   WBC 10*3/mm3 6.18 7.71 7.93 9.08 6.36   HEMOGLOBIN g/dL 7.6* 7.8* 8.2* 8.8* 8.4*   PLATELETS 10*3/mm3 150 122* 125* 134* 131*       Results from last 7 days   Lab Units 01/31/21  1539 01/28/21  2342   INR  1.28* 1.08         Imaging Results (Last 24 Hours)     ** No results found for the last 24 hours. **        atorvastatin, 10 mg, Oral, Nightly  clopidogrel, 75 mg, Oral, Daily  isosorbide mononitrate, 60 mg, Oral, Q24H  metoprolol tartrate, 25 mg, Oral, Q12H  torsemide, 40 mg, Oral, Daily           Medication Review:   Current Facility-Administered Medications   Medication Dose Route Frequency Provider Last Rate Last Admin   • acetaminophen (TYLENOL) tablet 650 mg  650 mg Oral Q4H PRN Mckenzie Christensen Jr., MD   650 mg at 01/31/21 1312   • atorvastatin (LIPITOR) tablet 10 mg  10 mg Oral Nightly Mckenzie Christensen Jr., MD   10 mg at 02/02/21 2143   • clopidogrel (PLAVIX) tablet 75 mg  75 mg Oral Daily Mckenzie Christensen Jr., MD   75 mg at 02/03/21 0850   • heparin (porcine) 5000 UNIT/ML injection 3,600-7,300 Units  40-80 Units/kg Intravenous Q6H PRN Mckenzie Christensen Jr., MD       • HYDROcodone-acetaminophen (NORCO) 5-325  MG per tablet 1 tablet  1 tablet Oral Q4H PRN Mckenzie Christensen Jr., MD   1 tablet at 01/31/21 1447   • isosorbide mononitrate (IMDUR) 24 hr tablet 60 mg  60 mg Oral Q24H Mckenzie Christensen Jr., MD   60 mg at 02/03/21 0850   • metoprolol tartrate (LOPRESSOR) tablet 25 mg  25 mg Oral Q12H Diana Garay APRN   25 mg at 02/03/21 0850   • morphine injection 1 mg  1 mg Intravenous Q4H PRN Mckenzie Christensen Jr., MD   1 mg at 01/31/21 1600    And   • naloxone (NARCAN) injection 0.4 mg  0.4 mg Intravenous Q5 Min PRN Mckenzie Christensen Jr., MD       • ondansetron (ZOFRAN) tablet 4 mg  4 mg Oral Q6H PRN Mckenzie Christensen Jr., MD        Or   • ondansetron (ZOFRAN) injection 4 mg  4 mg Intravenous Q6H PRN Mckenzie Christensen Jr., MD   4 mg at 01/31/21 1522   • temazepam (RESTORIL) capsule 15 mg  15 mg Oral Nightly PRN Mckenzie Christensen Jr., MD       • torsemide (DEMADEX) tablet 40 mg  40 mg Oral Daily Elton Lemos MD   40 mg at 02/03/21 0850       Assessment/Plan   1.  CKD stage III with background of multiple myeloma his protein to creatinine ratio was 122.7 mg/g  2.  Coronary artery disease with 3 vessel disease and underwent PCI and stents of all 3 vessels  3.  NSTEMI  4.  Multiple myeloma  5.  Anemia, hemoglobin is 7.6 receiving blood transfusion  6.  Chronic anticoagulation for prior DVT/PE  7. Acute left lower extremity deep vein thrombosis noted in the external iliac, common femoral, deep femoral, proximal femoral, mid femoral, distal femoral, popliteal, posterial tibial, peroneal, gastrocnemius and soleal. S/P stent placement on the left yesterday  Plan:    1.  Continue torsemide 40 mg/day  2.  Surveillance labs      Elton Lemos MD  02/03/21  14:19 EST

## 2021-02-03 NOTE — PROGRESS NOTES
"Patient Care Team:  Adolph Christensen MD as PCP - General (Internal Medicine)    Chief Complaint: Follow-up left main stenosis, PCI to the left main, coronary perforation, DVT    Interval History:   Patient states that he feels great.  He is anemic and this is slightly decreased from yesterday.    Objective   Vital Signs  Temp:  [97.1 °F (36.2 °C)-98.7 °F (37.1 °C)] 98.5 °F (36.9 °C)  Heart Rate:  [62-71] 71  Resp:  [15-16] 15  BP: (120-152)/(65-79) 152/79    Intake/Output Summary (Last 24 hours) at 2/3/2021 0903  Last data filed at 2/3/2021 0728  Gross per 24 hour   Intake 660 ml   Output 2340 ml   Net -1680 ml     Flowsheet Rows      First Filed Value   Admission Height  198.1 cm (78\") [Simultaneous filing. User may be unaware of other data.] Documented at 01/28/2021 2357   Admission Weight  91.9 kg (202 lb 11.2 oz) Documented at 01/28/2021 2201          General Appearance:    Alert, cooperative, in no acute distress   Head:    Normocephalic, without obvious abnormality, atraumatic       Neck:   No adenopathy, supple, no thyromegaly, no carotid bruit, no    JVD   Lungs:     Clear to auscultation bilaterally, no wheezes, rales, or     rhonchi    Heart:    Normal rate, regular rhythm, no murmur, no rub, no gallop   Chest Wall:    No abnormalities observed   Abdomen:     Normal bowel sounds, soft, nontender, nondistended,            no rebound tenderness   Extremities:   No cyanosis, clubbing, or edema   Pulses:   Pulses palpable and equal bilaterally   Skin:   No bleeding or rash       Neurologic:   Cranial nerves 2 - 12 grossly intact, sensation intact           apixaban, 10 mg, Oral, Q12H    Followed by  [START ON 2/9/2021] apixaban, 5 mg, Oral, Q12H  atorvastatin, 10 mg, Oral, Nightly  clopidogrel, 75 mg, Oral, Daily  isosorbide mononitrate, 60 mg, Oral, Q24H  metoprolol tartrate, 25 mg, Oral, Q12H  torsemide, 40 mg, Oral, Daily             Results Review:    Results from last 7 days   Lab Units 02/03/21  6620 "   SODIUM mmol/L 140   POTASSIUM mmol/L 3.8   CHLORIDE mmol/L 102   CO2 mmol/L 25.3   BUN mg/dL 27*   CREATININE mg/dL 1.83*   GLUCOSE mg/dL 92   CALCIUM mg/dL 8.3*         Results from last 7 days   Lab Units 02/03/21  0501   WBC 10*3/mm3 6.18   HEMOGLOBIN g/dL 7.6*   HEMATOCRIT % 22.7*   PLATELETS 10*3/mm3 150     Results from last 7 days   Lab Units 02/03/21  0501 02/02/21  0527 02/01/21  2041  01/31/21  1539  01/28/21  2342   INR   --   --   --   --  1.28*  --  1.08   APTT seconds 35.6* 59.0* >200.0*   < > 26.9   < > <20.0*    < > = values in this interval not displayed.     Results from last 7 days   Lab Units 01/30/21  0411   CHOLESTEROL mg/dL 111     Results from last 7 days   Lab Units 02/01/21  0355   MAGNESIUM mg/dL 2.5*     Results from last 7 days   Lab Units 01/30/21  0411   CHOLESTEROL mg/dL 111   TRIGLYCERIDES mg/dL 244*   HDL CHOL mg/dL 27*   LDL CHOL mg/dL 45     @LABRCNT(bnp)@  I reviewed the patient's new clinical results.  I personally viewed and interpreted the patient's EKG/Telemetry data            Assessment/Plan   1.  Coronary artery disease, multivessel.  Left main stent complicated by perforation sealed up with balloon tamponade.  2.  CKD  3.  Dementia    4.  DVT- s/p venogram with left iliac vein stent.     Plan:     -Patient with acute on chronic anemia.  It sounds like the plan is to get blood from a surgical standpoint today I think that is reasonable.  -I also do not think we need to be overly aggressive with his anticoagulation regimen and load him with Eliquis.  I would just move him over to 5 mg p.o. twice daily  -Continue Plavix.  No aspirin.  -Home in a.m.

## 2021-02-03 NOTE — PROGRESS NOTES
" LOS: 6 days   Patient Care Team:  Adolph Christensen MD as PCP - General (Internal Medicine)    Chief Complaint: CAD    Subjective:  Symptoms:  No shortness of breath or chest pain.    Diet:  Adequate intake.    Activity level: Returning to normal.    Pain:  He reports no pain.      States feels good, ambulating without issue but staff \"won't let him\" get up without the alarm going off    Vital Signs  Temp:  [97.1 °F (36.2 °C)-98.7 °F (37.1 °C)] 98.5 °F (36.9 °C)  Heart Rate:  [62-71] 71  Resp:  [15-16] 15  BP: (120-152)/(65-79) 152/79  Body mass index is 22.55 kg/m².    Intake/Output Summary (Last 24 hours) at 2/3/2021 0846  Last data filed at 2/3/2021 0728  Gross per 24 hour   Intake 660 ml   Output 2340 ml   Net -1680 ml     I/O this shift:  In: 240 [P.O.:240]  Out: 300 [Urine:300]          01/30/21  1255 02/01/21  0612 02/03/21  0500   Weight: 91.2 kg (201 lb) 87.8 kg (193 lb 8 oz) 88.5 kg (195 lb 1.6 oz)         Objective:  General Appearance:  Comfortable.    Vital signs: (most recent): Blood pressure 152/79, pulse 71, temperature 98.5 °F (36.9 °C), temperature source Oral, resp. rate 15, height 198.1 cm (78\"), weight 88.5 kg (195 lb 1.6 oz), SpO2 94 %.    Lungs:  Normal effort and normal respiratory rate.  Breath sounds clear to auscultation.  (Room air)  Heart: Normal rate.  Regular rhythm.  S1 normal and S2 normal.    Abdomen: Abdomen is soft and non-distended.    Extremities: There is dependent edema (trace pretibial edema left lower extremity ).    Pulses: Distal pulses are intact.    Neurological: Patient is alert and oriented to person, place and time.    Skin:  Warm and dry.              Results Review:        WBC WBC   Date Value Ref Range Status   02/03/2021 6.18 3.40 - 10.80 10*3/mm3 Final   02/02/2021 7.71 3.40 - 10.80 10*3/mm3 Final   02/01/2021 7.93 3.40 - 10.80 10*3/mm3 Final   01/31/2021 9.08 3.40 - 10.80 10*3/mm3 Final      HGB Hemoglobin   Date Value Ref Range Status   02/03/2021 7.6 (L) " 13.0 - 17.7 g/dL Final   02/02/2021 7.8 (L) 13.0 - 17.7 g/dL Final   02/01/2021 8.2 (L) 13.0 - 17.7 g/dL Final   01/31/2021 8.8 (L) 13.0 - 17.7 g/dL Final      HCT Hematocrit   Date Value Ref Range Status   02/03/2021 22.7 (L) 37.5 - 51.0 % Final   02/02/2021 23.7 (L) 37.5 - 51.0 % Final   02/01/2021 25.5 (L) 37.5 - 51.0 % Final   01/31/2021 25.7 (L) 37.5 - 51.0 % Final      Platelets Platelets   Date Value Ref Range Status   02/03/2021 150 140 - 450 10*3/mm3 Final   02/02/2021 122 (L) 140 - 450 10*3/mm3 Final   02/01/2021 125 (L) 140 - 450 10*3/mm3 Final   01/31/2021 134 (L) 140 - 450 10*3/mm3 Final        PT/INR:    Protime   Date Value Ref Range Status   01/31/2021 15.8 (H) 11.7 - 14.2 Seconds Final   /  INR   Date Value Ref Range Status   01/31/2021 1.28 (H) 0.90 - 1.10 Final       Sodium Sodium   Date Value Ref Range Status   02/03/2021 140 136 - 145 mmol/L Final   02/02/2021 138 136 - 145 mmol/L Final   02/01/2021 136 136 - 145 mmol/L Final      Potassium Potassium   Date Value Ref Range Status   02/03/2021 3.8 3.5 - 5.2 mmol/L Final   02/02/2021 4.0 3.5 - 5.2 mmol/L Final   02/01/2021 4.2 3.5 - 5.2 mmol/L Final      Chloride Chloride   Date Value Ref Range Status   02/03/2021 102 98 - 107 mmol/L Final   02/02/2021 104 98 - 107 mmol/L Final   02/01/2021 103 98 - 107 mmol/L Final      Bicarbonate CO2   Date Value Ref Range Status   02/03/2021 25.3 22.0 - 29.0 mmol/L Final   02/02/2021 22.6 22.0 - 29.0 mmol/L Final   02/01/2021 22.8 22.0 - 29.0 mmol/L Final      BUN BUN   Date Value Ref Range Status   02/03/2021 27 (H) 8 - 23 mg/dL Final   02/02/2021 22 8 - 23 mg/dL Final   02/01/2021 26 (H) 8 - 23 mg/dL Final      Creatinine Creatinine   Date Value Ref Range Status   02/03/2021 1.83 (H) 0.76 - 1.27 mg/dL Final   02/02/2021 1.63 (H) 0.76 - 1.27 mg/dL Final   02/01/2021 1.48 (H) 0.76 - 1.27 mg/dL Final      Calcium Calcium   Date Value Ref Range Status   02/03/2021 8.3 (L) 8.6 - 10.5 mg/dL Final   02/02/2021 8.4  (L) 8.6 - 10.5 mg/dL Final   02/01/2021 8.7 8.6 - 10.5 mg/dL Final      Magnesium Magnesium   Date Value Ref Range Status   02/01/2021 2.5 (H) 1.6 - 2.4 mg/dL Final      Troponin No results found for: TROPONIN   BNP No results found for: BNP         apixaban, 10 mg, Oral, Q12H    Followed by  [START ON 2/9/2021] apixaban, 5 mg, Oral, Q12H  atorvastatin, 10 mg, Oral, Nightly  clopidogrel, 75 mg, Oral, Daily  isosorbide mononitrate, 60 mg, Oral, Q24H  metoprolol tartrate, 25 mg, Oral, Q12H  torsemide, 40 mg, Oral, Daily           PRN Meds:.•  acetaminophen  •  heparin (porcine)  •  HYDROcodone-acetaminophen  •  Morphine **AND** naloxone  •  ondansetron **OR** ondansetron  •  temazepam    Patient Active Problem List   Diagnosis Code   • Coronary artery disease of native heart with stable angina pectoris (CMS/Formerly [facility]) I25.118   • CAD (coronary artery disease) I25.10   • Asthma J45.909   • Foot callus L84   • Hypercholesterolemia E78.00   • Hypertensive disorder I10   • Acute deep vein thrombosis (DVT) of left iliofemoral vein (CMS/[facility]) I82.422       Assessment & Plan    -NSTEMI (Dr. Apolinar Marin), severe multivessel CAD s/p complex PCI LM, LAD, and Lcx----complicated by left main dissection (sealed), no effusion on echo (Semder), on Plavix  -DVT, acute, LLE external iliac, common femoral, deep femoral, proximal femoral, mid femoral, distal femoral, popliteal, posterial tibial, peroneal, gastrocnemius and soleal occlusion POD #1 s/p angioplasty and stent----on Eliquis, vascular follow up as outpatient   -Multiple myeloma --Chemotherapy-- followed by Dr. Olsen in [place]  -hx of DVT, remote----- IVC filter in place  -CKD III, creatinine 1.6 on admit----renal following  -Acute on chronic anemia    Give 2 units PRBC today, monitor Hb and creatinine overnight.  PT to evaluate today, anticipate discharge in am if all agree.  Vascular follow up in 2 months with venous scan.        Meeta Bello, OLLIE  02/03/21  08:46 EST

## 2021-02-04 ENCOUNTER — TELEPHONE (OUTPATIENT)
Dept: CARDIOLOGY | Facility: CLINIC | Age: 81
End: 2021-02-04

## 2021-02-04 ENCOUNTER — READMISSION MANAGEMENT (OUTPATIENT)
Dept: CALL CENTER | Facility: HOSPITAL | Age: 81
End: 2021-02-04

## 2021-02-04 VITALS
TEMPERATURE: 98.8 F | WEIGHT: 195.1 LBS | DIASTOLIC BLOOD PRESSURE: 92 MMHG | BODY MASS INDEX: 22.57 KG/M2 | RESPIRATION RATE: 18 BRPM | HEIGHT: 78 IN | SYSTOLIC BLOOD PRESSURE: 177 MMHG | OXYGEN SATURATION: 95 % | HEART RATE: 60 BPM

## 2021-02-04 LAB
ALBUMIN SERPL-MCNC: 3.1 G/DL (ref 3.5–5.2)
ANION GAP SERPL CALCULATED.3IONS-SCNC: 11.9 MMOL/L (ref 5–15)
APTT PPP: 30.1 SECONDS (ref 22.7–35.4)
BASOPHILS # BLD AUTO: 0.03 10*3/MM3 (ref 0–0.2)
BASOPHILS NFR BLD AUTO: 0.6 % (ref 0–1.5)
BH BB BLOOD EXPIRATION DATE: NORMAL
BH BB BLOOD EXPIRATION DATE: NORMAL
BH BB BLOOD TYPE BARCODE: 7300
BH BB BLOOD TYPE BARCODE: 7300
BH BB DISPENSE STATUS: NORMAL
BH BB DISPENSE STATUS: NORMAL
BH BB PRODUCT CODE: NORMAL
BH BB PRODUCT CODE: NORMAL
BH BB UNIT NUMBER: NORMAL
BH BB UNIT NUMBER: NORMAL
BUN SERPL-MCNC: 28 MG/DL (ref 8–23)
BUN/CREAT SERPL: 19.4 (ref 7–25)
CALCIUM SPEC-SCNC: 8.8 MG/DL (ref 8.6–10.5)
CHLORIDE SERPL-SCNC: 99 MMOL/L (ref 98–107)
CO2 SERPL-SCNC: 26.1 MMOL/L (ref 22–29)
CREAT SERPL-MCNC: 1.44 MG/DL (ref 0.76–1.27)
CROSSMATCH INTERPRETATION: NORMAL
CROSSMATCH INTERPRETATION: NORMAL
DEPRECATED RDW RBC AUTO: 58.1 FL (ref 37–54)
EOSINOPHIL # BLD AUTO: 0.4 10*3/MM3 (ref 0–0.4)
EOSINOPHIL NFR BLD AUTO: 8.2 % (ref 0.3–6.2)
ERYTHROCYTE [DISTWIDTH] IN BLOOD BY AUTOMATED COUNT: 18 % (ref 12.3–15.4)
GFR SERPL CREATININE-BSD FRML MDRD: 47 ML/MIN/1.73
GLUCOSE BLDC GLUCOMTR-MCNC: 177 MG/DL (ref 70–130)
GLUCOSE SERPL-MCNC: 102 MG/DL (ref 65–99)
HCT VFR BLD AUTO: 28.3 % (ref 37.5–51)
HGB BLD-MCNC: 9.6 G/DL (ref 13–17.7)
IMM GRANULOCYTES # BLD AUTO: 0.02 10*3/MM3 (ref 0–0.05)
IMM GRANULOCYTES NFR BLD AUTO: 0.4 % (ref 0–0.5)
LYMPHOCYTES # BLD AUTO: 1.2 10*3/MM3 (ref 0.7–3.1)
LYMPHOCYTES NFR BLD AUTO: 24.7 % (ref 19.6–45.3)
MCH RBC QN AUTO: 30.6 PG (ref 26.6–33)
MCHC RBC AUTO-ENTMCNC: 33.9 G/DL (ref 31.5–35.7)
MCV RBC AUTO: 90.1 FL (ref 79–97)
MONOCYTES # BLD AUTO: 0.74 10*3/MM3 (ref 0.1–0.9)
MONOCYTES NFR BLD AUTO: 15.2 % (ref 5–12)
NEUTROPHILS NFR BLD AUTO: 2.47 10*3/MM3 (ref 1.7–7)
NEUTROPHILS NFR BLD AUTO: 50.9 % (ref 42.7–76)
NRBC BLD AUTO-RTO: 0 /100 WBC (ref 0–0.2)
PHOSPHATE SERPL-MCNC: 3.9 MG/DL (ref 2.5–4.5)
PLATELET # BLD AUTO: 155 10*3/MM3 (ref 140–450)
PMV BLD AUTO: 11 FL (ref 6–12)
POTASSIUM SERPL-SCNC: 3.7 MMOL/L (ref 3.5–5.2)
RBC # BLD AUTO: 3.14 10*6/MM3 (ref 4.14–5.8)
SODIUM SERPL-SCNC: 137 MMOL/L (ref 136–145)
UNIT  ABO: NORMAL
UNIT  ABO: NORMAL
UNIT  RH: NORMAL
UNIT  RH: NORMAL
WBC # BLD AUTO: 4.86 10*3/MM3 (ref 3.4–10.8)

## 2021-02-04 PROCEDURE — 80069 RENAL FUNCTION PANEL: CPT | Performed by: NURSE PRACTITIONER

## 2021-02-04 PROCEDURE — 85025 COMPLETE CBC W/AUTO DIFF WBC: CPT | Performed by: SURGERY

## 2021-02-04 PROCEDURE — 85730 THROMBOPLASTIN TIME PARTIAL: CPT | Performed by: SURGERY

## 2021-02-04 PROCEDURE — 82962 GLUCOSE BLOOD TEST: CPT

## 2021-02-04 RX ORDER — TORSEMIDE 20 MG/1
40 TABLET ORAL DAILY
Qty: 60 TABLET | Refills: 2 | Status: SHIPPED | OUTPATIENT
Start: 2021-02-05 | End: 2021-02-04

## 2021-02-04 RX ORDER — TORSEMIDE 20 MG/1
40 TABLET ORAL DAILY
Qty: 60 TABLET | Refills: 2 | Status: SHIPPED | OUTPATIENT
Start: 2021-02-05 | End: 2021-05-06

## 2021-02-04 RX ORDER — ISOSORBIDE MONONITRATE 60 MG/1
60 TABLET, EXTENDED RELEASE ORAL
Qty: 60 TABLET | Refills: 5 | Status: SHIPPED | OUTPATIENT
Start: 2021-02-04

## 2021-02-04 RX ORDER — CLOPIDOGREL BISULFATE 75 MG/1
75 TABLET ORAL DAILY
Qty: 30 TABLET | Refills: 5 | Status: SHIPPED | OUTPATIENT
Start: 2021-02-04

## 2021-02-04 RX ORDER — ATORVASTATIN CALCIUM 10 MG/1
10 TABLET, FILM COATED ORAL NIGHTLY
Qty: 60 TABLET | Refills: 5 | Status: SHIPPED | OUTPATIENT
Start: 2021-02-04

## 2021-02-04 RX ADMIN — METOPROLOL TARTRATE 25 MG: 25 TABLET, FILM COATED ORAL at 08:40

## 2021-02-04 RX ADMIN — ISOSORBIDE MONONITRATE 60 MG: 60 TABLET ORAL at 08:40

## 2021-02-04 RX ADMIN — APIXABAN 5 MG: 5 TABLET, FILM COATED ORAL at 14:57

## 2021-02-04 RX ADMIN — CLOPIDOGREL 75 MG: 75 TABLET, FILM COATED ORAL at 08:40

## 2021-02-04 RX ADMIN — TORSEMIDE 40 MG: 20 TABLET ORAL at 08:40

## 2021-02-04 NOTE — TELEPHONE ENCOUNTER
Hospital called through our scheduling dept asking if it was ok to switch pt's one week hospital  f/u to video call due to having two closely scheduled appts with us and living out of town, Pt's daughter informed them it was really hard to get PT back and forth to appts. Please advise.    DA

## 2021-02-04 NOTE — PROGRESS NOTES
" LOS: 7 days   Patient Care Team:  Adolph Christensen MD as PCP - General (Internal Medicine)    Chief Complaint: CAD    Subjective:  Symptoms:  No shortness of breath, cough or chest pain.    Diet:  Adequate intake.  No nausea or vomiting.    Activity level: Normal.    Pain:  He reports no pain.      Feeling good this morning, wants to go home    Vital Signs  Temp:  [97.9 °F (36.6 °C)-98.8 °F (37.1 °C)] 98.8 °F (37.1 °C)  Heart Rate:  [54-73] 54  Resp:  [16-18] 18  BP: (118-171)/(64-93) 149/70  Body mass index is 22.55 kg/m².    Intake/Output Summary (Last 24 hours) at 2/4/2021 0754  Last data filed at 2/3/2021 2000  Gross per 24 hour   Intake 774.67 ml   Output 1300 ml   Net -525.33 ml     No intake/output data recorded.          01/30/21  1255 02/01/21  0612 02/03/21  0500   Weight: 91.2 kg (201 lb) 87.8 kg (193 lb 8 oz) 88.5 kg (195 lb 1.6 oz)     Objective:  General Appearance:  Comfortable and in no acute distress.    Vital signs: (most recent): Blood pressure 149/70, pulse 54, temperature 98.8 °F (37.1 °C), temperature source Oral, resp. rate 18, height 198.1 cm (78\"), weight 88.5 kg (195 lb 1.6 oz), SpO2 93 %.  Vital signs are normal.  No fever.    Output: Producing urine and producing stool.    Lungs:  Normal effort and normal respiratory rate.    Heart: Normal rate.  Regular rhythm.    Abdomen: Abdomen is soft.  Bowel sounds are normal.     Extremities: There is no dependent edema.    Pulses: Distal pulses are intact.    Neurological: Patient is alert and oriented to person, place and time.    Skin:  Warm and dry.          Results Review:        WBC WBC   Date Value Ref Range Status   02/04/2021 4.86 3.40 - 10.80 10*3/mm3 Final   02/03/2021 6.18 3.40 - 10.80 10*3/mm3 Final   02/02/2021 7.71 3.40 - 10.80 10*3/mm3 Final      HGB Hemoglobin   Date Value Ref Range Status   02/04/2021 9.6 (L) 13.0 - 17.7 g/dL Final   02/03/2021 7.6 (L) 13.0 - 17.7 g/dL Final   02/02/2021 7.8 (L) 13.0 - 17.7 g/dL Final    "   HCT Hematocrit   Date Value Ref Range Status   02/04/2021 28.3 (L) 37.5 - 51.0 % Final   02/03/2021 22.7 (L) 37.5 - 51.0 % Final   02/02/2021 23.7 (L) 37.5 - 51.0 % Final      Platelets Platelets   Date Value Ref Range Status   02/04/2021 155 140 - 450 10*3/mm3 Final   02/03/2021 150 140 - 450 10*3/mm3 Final   02/02/2021 122 (L) 140 - 450 10*3/mm3 Final        PT/INR:  No results found for: PROTIME/No results found for: INR    Sodium Sodium   Date Value Ref Range Status   02/04/2021 137 136 - 145 mmol/L Final   02/03/2021 140 136 - 145 mmol/L Final   02/02/2021 138 136 - 145 mmol/L Final      Potassium Potassium   Date Value Ref Range Status   02/04/2021 3.7 3.5 - 5.2 mmol/L Final   02/03/2021 3.8 3.5 - 5.2 mmol/L Final   02/02/2021 4.0 3.5 - 5.2 mmol/L Final      Chloride Chloride   Date Value Ref Range Status   02/04/2021 99 98 - 107 mmol/L Final   02/03/2021 102 98 - 107 mmol/L Final   02/02/2021 104 98 - 107 mmol/L Final      Bicarbonate CO2   Date Value Ref Range Status   02/04/2021 26.1 22.0 - 29.0 mmol/L Final   02/03/2021 25.3 22.0 - 29.0 mmol/L Final   02/02/2021 22.6 22.0 - 29.0 mmol/L Final      BUN BUN   Date Value Ref Range Status   02/04/2021 28 (H) 8 - 23 mg/dL Final   02/03/2021 27 (H) 8 - 23 mg/dL Final   02/02/2021 22 8 - 23 mg/dL Final      Creatinine Creatinine   Date Value Ref Range Status   02/04/2021 1.44 (H) 0.76 - 1.27 mg/dL Final   02/03/2021 1.83 (H) 0.76 - 1.27 mg/dL Final   02/02/2021 1.63 (H) 0.76 - 1.27 mg/dL Final      Calcium Calcium   Date Value Ref Range Status   02/04/2021 8.8 8.6 - 10.5 mg/dL Final   02/03/2021 8.3 (L) 8.6 - 10.5 mg/dL Final   02/02/2021 8.4 (L) 8.6 - 10.5 mg/dL Final      Magnesium No results found for: MG       apixaban, 5 mg, Oral, Q12H  atorvastatin, 10 mg, Oral, Nightly  clopidogrel, 75 mg, Oral, Daily  isosorbide mononitrate, 60 mg, Oral, Q24H  metoprolol tartrate, 25 mg, Oral, Q12H  torsemide, 40 mg, Oral, Daily         Patient Active Problem List    Diagnosis Code   • Coronary artery disease of native heart with stable angina pectoris (CMS/[facility]) I25.118   • CAD (coronary artery disease) I25.10   • Asthma J45.909   • Foot callus L84   • Hypercholesterolemia E78.00   • Hypertensive disorder I10   • Acute deep vein thrombosis (DVT) of left iliofemoral vein (CMS/[facility]) I82.422       Assessment & Plan   -NSTEMI (Dr. Apolinar Marin), severe multivessel CAD s/p complex PCI LM, LAD, and Lcx----complicated by left main dissection (sealed), no effusion on echo (Semder), on Plavix  -DVT, acute, LLE external iliac, common femoral, deep femoral, proximal femoral, mid femoral, distal femoral, popliteal, posterial tibial, peroneal, gastrocnemius and soleal occlusion POD #2 s/p angioplasty and stent----on Eliquis, vascular follow up as outpatient   -Multiple myeloma --Chemotherapy-- followed by Dr. Olsen in [place]  -DVT remote----- IVC filter in place  -CKD III, creatinine 1.6 on admit----renal following  -Acute on chronic anemia    Feeling good this morning  2 units of RBC yesterday--Hgb improved to 9.6  Continued on Eliquis  Creatinine improving--diuretics per renal  Mobility at baseline  Anticipate discharge later today if okay with all    Jessenia Krishna, APRN  02/04/21  07:54 EST

## 2021-02-04 NOTE — PROGRESS NOTES
Continued Stay Note  [facility]     Patient Name: Richy Kong  MRN: [id number]  Today's Date: 2/4/2021    Admit Date: 1/28/2021    Discharge Plan     Row Name 02/04/21 1114       Plan    Plan  Home    Plan Comments  Call received from RN reporting that pt's daughter/POA is requesting assistance.  She states that travel to [place] for appointments is problematic for them.  Call placed to Dr. Golden's office and was instructed that they can do video visits.  The provider will decide based on the reason for each visit.  Call placed to Dr. Muro's office and the answer was the same.  They will schedule the follow up on 2/8/2021 with NIALL LEVIN as video.  Office staff will be in contact with Mr. Kong and his daughter concerning the process for the visit and they will need to request video visits when speaking with the provider about next visits.  Alerted EMERY Cruz who will update patient and family.        Discharge Codes    No documentation.       Expected Discharge Date and Time     Expected Discharge Date Expected Discharge Time    Feb 4, 2021             Keara Tee RN

## 2021-02-04 NOTE — PROGRESS NOTES
"   LOS: 7 days    Patient Care Team:  Adolph Christensen MD as PCP - General (Internal Medicine)    Chief Complaint:  No chief complaint on file.    Follow-up renal dysfunction probably CKD stage III  Subjective     Interval History:     Seen and examined.  No shortness of air or chest pain.status post a blood transfusion.  Feels much better.  Wants to go home.      Review of Systems:   As noted above    Objective     Vital Signs  Temp:  [97.9 °F (36.6 °C)-98.8 °F (37.1 °C)] 98.8 °F (37.1 °C)  Heart Rate:  [54-73] 60  Resp:  [17-18] 18  BP: (119-177)/(64-93) 177/92    Flowsheet Rows      First Filed Value   Admission Height  198.1 cm (78\") [Simultaneous filing. User may be unaware of other data.] Documented at 01/28/2021 2357   Admission Weight  91.9 kg (202 lb 11.2 oz) Documented at 01/28/2021 2201          I/O this shift:  In: 240 [P.O.:240]  Out: -   I/O last 3 completed shifts:  In: 1314.7 [P.O.:540; Blood:774.7]  Out: 2620 [Urine:2620]    Intake/Output Summary (Last 24 hours) at 2/4/2021 1156  Last data filed at 2/4/2021 1130  Gross per 24 hour   Intake 1254.67 ml   Output 500 ml   Net 754.67 ml       Physical Exam:  General Appearance: alert, oriented x 3, no acute distress, obese  Skin: warm and dry  HEENT: Oral mucosa normal, nonicteric sclera  Neck: supple, no JVD, trachea midline  Lungs: CTA, unlabored breathing effort  Heart: RRR, normal S1 and S2, no S3, no rub  Abdomen: soft, nontender, normoactive bowels  : no palpable bladder,  Extremities: 1-2+ left lower extremity edema which is chronic, no cyanosis or clubbing, no right ankle edema  Neuro: normal speech and mental status       Results Review:    Results from last 7 days   Lab Units 02/04/21  0532 02/03/21  0501 02/02/21  0916  01/28/21  2342   SODIUM mmol/L 137 140 138   < > 140   POTASSIUM mmol/L 3.7 3.8 4.0   < > 4.0   CHLORIDE mmol/L 99 102 104   < > 104   CO2 mmol/L 26.1 25.3 22.6   < > 24.5   BUN mg/dL 28* 27* 22   < > 33*   CREATININE " mg/dL 1.44* 1.83* 1.63*   < > 1.60*   CALCIUM mg/dL 8.8 8.3* 8.4*   < > 8.6   BILIRUBIN mg/dL  --  0.4 0.4  --  0.3   ALK PHOS U/L  --  51 48  --  53   ALT (SGPT) U/L  --  10 12  --  18   AST (SGOT) U/L  --  11 11  --  19   GLUCOSE mg/dL 102* 92 113*   < > 99    < > = values in this interval not displayed.       Estimated Creatinine Clearance: 50.4 mL/min (A) (by C-G formula based on SCr of 1.44 mg/dL (H)).    Results from last 7 days   Lab Units 02/04/21  0532 02/01/21  0355 01/31/21  0416  01/28/21  2342   MAGNESIUM mg/dL  --  2.5* 2.5*  --  2.2   PHOSPHORUS mg/dL 3.9 4.0 3.5   < >  --     < > = values in this interval not displayed.       Results from last 7 days   Lab Units 02/01/21  0355 01/31/21  0416   URIC ACID mg/dL 8.2* 8.1*       Results from last 7 days   Lab Units 02/04/21  0532 02/03/21  0501 02/02/21  0527 02/01/21  0355 01/31/21  1539   WBC 10*3/mm3 4.86 6.18 7.71 7.93 9.08   HEMOGLOBIN g/dL 9.6* 7.6* 7.8* 8.2* 8.8*   PLATELETS 10*3/mm3 155 150 122* 125* 134*       Results from last 7 days   Lab Units 01/31/21  1539 01/28/21  2342   INR  1.28* 1.08         Imaging Results (Last 24 Hours)     ** No results found for the last 24 hours. **        apixaban, 5 mg, Oral, Q12H  atorvastatin, 10 mg, Oral, Nightly  clopidogrel, 75 mg, Oral, Daily  isosorbide mononitrate, 60 mg, Oral, Q24H  metoprolol tartrate, 25 mg, Oral, Q12H  torsemide, 40 mg, Oral, Daily           Medication Review:   Current Facility-Administered Medications   Medication Dose Route Frequency Provider Last Rate Last Admin   • acetaminophen (TYLENOL) tablet 650 mg  650 mg Oral Q4H PRN Mckenzie Christensen Jr., MD   650 mg at 01/31/21 1312   • apixaban (ELIQUIS) tablet 5 mg  5 mg Oral Q12H Jessenia Krishna, APRN       • atorvastatin (LIPITOR) tablet 10 mg  10 mg Oral Nightly Mckenzie Christensen Jr., MD   10 mg at 02/03/21 2012   • clopidogrel (PLAVIX) tablet 75 mg  75 mg Oral Daily Mckenzie Christensen Jr., MD   75 mg at 02/04/21  0840   • HYDROcodone-acetaminophen (NORCO) 5-325 MG per tablet 1 tablet  1 tablet Oral Q4H PRN Mckenzie Christensen Jr., MD   1 tablet at 01/31/21 1447   • isosorbide mononitrate (IMDUR) 24 hr tablet 60 mg  60 mg Oral Q24H Mckenzie Christensen Jr., MD   60 mg at 02/04/21 0840   • metoprolol tartrate (LOPRESSOR) tablet 25 mg  25 mg Oral Q12H Diana Garay APRN   25 mg at 02/04/21 0840   • morphine injection 1 mg  1 mg Intravenous Q4H PRN Mckenzie Christensen Jr., MD   1 mg at 01/31/21 1600    And   • naloxone (NARCAN) injection 0.4 mg  0.4 mg Intravenous Q5 Min PRN Mckenzie Christensen Jr., MD       • ondansetron (ZOFRAN) tablet 4 mg  4 mg Oral Q6H PRN Mckenzie Christensen Jr., MD        Or   • ondansetron (ZOFRAN) injection 4 mg  4 mg Intravenous Q6H PRN Mckenzie Christensen Jr., MD   4 mg at 01/31/21 1522   • temazepam (RESTORIL) capsule 15 mg  15 mg Oral Nightly PRN Mckenzie Christensen Jr., MD       • torsemide (DEMADEX) tablet 40 mg  40 mg Oral Daily Elton Lemos MD   40 mg at 02/04/21 0840       Assessment/Plan   1.  CKD stage III with background of multiple myeloma his protein to creatinine ratio was 122.7 mg/g  2.  Coronary artery disease with 3 vessel disease and underwent PCI and stents of all 3 vessels  3.  NSTEMI  4.  Multiple myeloma  5.  Anemia, hemoglobin is 9.6 S/P  receiving blood transfusion  6.  Chronic anticoagulation for prior DVT/PE  7. Acute left lower extremity deep vein thrombosis noted in the external iliac, common femoral, deep femoral, proximal femoral, mid femoral, distal femoral, popliteal, posterial tibial, peroneal, gastrocnemius and soleal. S/P stent placement on the left yesterday  Plan:    1.  Continue torsemide 40 mg/day at discharge.  Patient will need to see my partner Dr. Winston in 2 weeks in [place], [place]  2.  Surveillance labs      Elton Lemos MD  02/04/21  11:56 EST     DISPLAY PLAN FREE TEXT

## 2021-02-04 NOTE — OUTREACH NOTE
Prep Survey      Responses   [facility] patient discharged from?  [place]   Is LACE score < 7 ?  No   Emergency Room discharge w/ pulse ox?  No   [facility]ibility  [facility]   Date of Admission  01/28/21   Date of Discharge  02/04/21   Discharge Disposition  Home or Self Care   Discharge diagnosis  NSTEMIlower extremity venogram with left iliac vein angioplasty and stent, from bilateral popliteal vein    Does the patient have one of the following disease processes/diagnoses(primary or secondary)?  Acute MI (STEMI,NSTEMI)   Does the patient have Home health ordered?  No   Is there a DME ordered?  No   Prep survey completed?  Yes          Bernie De RN

## 2021-02-04 NOTE — PLAN OF CARE
Goal Outcome Evaluation:         Status improved. VSS spoke with daughter regarding discharge needs and follow up appointments. Appropriate for discharge.

## 2021-02-04 NOTE — DISCHARGE SUMMARY
Date of Admission: 01/28/2021  Date of Discharge:  2/4/2021    Discharge Diagnosis:   -NSTEMI (StearnsDr. Jiang), severe multivessel CAD s/p complex PCI LM, LAD, and Lcx----complicated by left main dissection (sealed), no effusion on echo (Jina)  -DVT, acute, LLE external iliac, common femoral, deep femoral, proximal femoral, mid femoral, distal femoral, popliteal, posterial tibial, peroneal, gastrocnemius and soleal occlusiond stent  -Multiple myeloma --Chemotherapy-- followed by Dr. Olsen in [place]  -DVT remote----- IVC filter in place  -CKD III, -Acute on chronic anemia    Presenting Problem/History of Present Illness  CAD (coronary artery disease) [I25.10]     Hospital Course  Patient is a 80 y.o. male who was transferred here from [facility] for cardiac surgery evaluation. Upon further work up he was deemed to not be a surgical candidate. He underwent stent placement by Dr. Muro on 1/29/21. He was planning to be discharged, but was noted to have swelling and pain in the left leg. A left lower extremity duplex revealed acute iliac femoral-popliteal DVT. Vascular surgery was consulted and on 02/01/21 he underwent left lower extremity venogram with left iliac vein angioplasty and stent, from bilateral popliteal vein approach with . He did well following the procedure. He was placed on a heparin gtt and was transitioned to Eliquis on 2/2/21. He was noted to have some anemia following his procedure and received 2 units of PRBC on 2/3/21. He was successfully weaned off of oxygen, and his mobility is at baseline. He is tolerating the current medication regimen. He is eating and drinking sufficiently, and is urinating and defecating without difficulty. He was deemed ready for discharge. He is to follow up with OLLIE Nino in 1-2 weeks, Dr. Jiang in 1-2 months, and  in 2 months with repeat venous study.    Procedures Performed  Procedure(s):  LFT LEG VENOGRAM, left  mechanical thrombectomy       Consults:   Consults     Date and Time Order Name Status Description    1/31/2021 1527 Inpatient Vascular Surgery Consult Completed     1/29/2021 1031 Inpatient Cardiology Consult      1/29/2021 0914 Hematology & Oncology Inpatient Consult Completed     1/29/2021 0813 Inpatient Nephrology Consult Completed     1/28/2021 2206 Inpatient Anesthesiology Consult            Pertinent Test Results:    Lab Results   Component Value Date    WBC 4.86 02/04/2021    HGB 9.6 (L) 02/04/2021    HCT 28.3 (L) 02/04/2021    MCV 90.1 02/04/2021     02/04/2021      Lab Results   Component Value Date    GLUCOSE 102 (H) 02/04/2021    CALCIUM 8.8 02/04/2021     02/04/2021    K 3.7 02/04/2021    CO2 26.1 02/04/2021    CL 99 02/04/2021    BUN 28 (H) 02/04/2021    CREATININE 1.44 (H) 02/04/2021    EGFRIFNONA 47 (L) 02/04/2021    BCR 19.4 02/04/2021    ANIONGAP 11.9 02/04/2021     Lab Results   Component Value Date    INR 1.28 (H) 01/31/2021    PROTIME 15.8 (H) 01/31/2021     Condition on Discharge:  Stable    Vital Signs  Temp:  [97.9 °F (36.6 °C)-98.8 °F (37.1 °C)] 98.8 °F (37.1 °C)  Heart Rate:  [54-73] 60  Resp:  [17-18] 18  BP: (126-177)/(64-93) 177/92      Discharge Disposition  Home or Self Care    Discharge Medications     Discharge Medications      New Medications      Instructions Start Date   apixaban 5 MG tablet tablet  Commonly known as: ELIQUIS   5 mg, Oral, Every 12 Hours Scheduled      atorvastatin 10 MG tablet  Commonly known as: LIPITOR   10 mg, Oral, Nightly      clopidogrel 75 MG tablet  Commonly known as: PLAVIX   75 mg, Oral, Daily      isosorbide mononitrate 60 MG 24 hr tablet  Commonly known as: IMDUR   60 mg, Oral, Every 24 Hours Scheduled      torsemide 20 MG tablet  Commonly known as: DEMADEX   40 mg, Oral, Daily   Start Date: February 5, 2021        Changes to Medications      Instructions Start Date   metoprolol tartrate 25 MG tablet  Commonly known as: LOPRESSOR  What  changed: how much to take   25 mg, Oral, 2 Times Daily         Continue These Medications      Instructions Start Date   acyclovir 400 MG tablet  Commonly known as: ZOVIRAX  Notes to patient: Resume Home Schedule   400 mg, Oral, 2 Times Daily, Take no more than 5 doses a day.       cetirizine 5 MG tablet  Commonly known as: zyrTEC  Notes to patient: Resume Home Schedule   10 mg, Oral, Daily      erythromycin 5 MG/GM ophthalmic ointment  Commonly known as: ROMYCIN  Notes to patient: Resume Home Schedule   1 application, Both Eyes, Every 6 Hours      tamsulosin 0.4 MG capsule 24 hr capsule  Commonly known as: FLOMAX  Notes to patient: Resume Home Schedule   1 capsule, Oral, Daily      vitamin B-12 500 MCG tablet  Commonly known as: CYANOCOBALAMIN  Notes to patient: Resume Home Schedule   500 mcg, Oral, Daily         Stop These Medications    aspirin 81 MG chewable tablet            Discharge Diet:     Activity at Discharge:     Follow-up Appointments  Future Appointments   Date Time Provider Department Center   2/8/2021  2:00 PM Diana Garay APRN [facility] None   3/3/2021 11:20 AM Alber Muro MD [facility] None     Additional Instructions for the Follow-ups that You Need to Schedule     Ambulatory Referral to Cardiac Rehab   As directed      Discharge Follow-up with PCP   As directed       Currently Documented PCP:    Adolph Christensen MD    PCP Phone Number:    [phone]     Follow Up Details: in 1 week         Discharge Follow-up with Specialty: OLLIE Nino; 1 Week   As directed      Specialty: OLLIE Nino    Follow Up: 1 Week    Follow Up Details: bring all prescription bottles to appointment         Discharge Follow-up with Specified Provider: Cardiologist--Dr. Jiang; 1 Month   As directed      To: Cardiologist--Dr. Jiang    Follow Up: 1 Month    Follow Up Details: call for appointment, bring all medication bottles to appointment               Test Results Pending at  Discharge       Jessenia Krishna, APRN  02/04/21  12:40 EST

## 2021-02-08 ENCOUNTER — TELEPHONE (OUTPATIENT)
Dept: CARDIOLOGY | Facility: CLINIC | Age: 81
End: 2021-02-08

## 2021-02-08 ENCOUNTER — TELEMEDICINE (OUTPATIENT)
Dept: CARDIOLOGY | Facility: CLINIC | Age: 81
End: 2021-02-08

## 2021-02-08 VITALS
HEART RATE: 64 BPM | BODY MASS INDEX: 23.02 KG/M2 | HEIGHT: 78 IN | DIASTOLIC BLOOD PRESSURE: 64 MMHG | WEIGHT: 199 LBS | SYSTOLIC BLOOD PRESSURE: 148 MMHG

## 2021-02-08 DIAGNOSIS — Z95.820 S/P ANGIOPLASTY WITH STENT: ICD-10-CM

## 2021-02-08 DIAGNOSIS — E78.00 HYPERCHOLESTEROLEMIA: ICD-10-CM

## 2021-02-08 DIAGNOSIS — I25.118 CORONARY ARTERY DISEASE OF NATIVE ARTERY OF NATIVE HEART WITH STABLE ANGINA PECTORIS (HCC): ICD-10-CM

## 2021-02-08 DIAGNOSIS — I10 ESSENTIAL HYPERTENSION: ICD-10-CM

## 2021-02-08 DIAGNOSIS — I82.422 ACUTE DEEP VEIN THROMBOSIS (DVT) OF LEFT ILIOFEMORAL VEIN (HCC): Primary | ICD-10-CM

## 2021-02-08 PROCEDURE — 99214 OFFICE O/P EST MOD 30 MIN: CPT | Performed by: NURSE PRACTITIONER

## 2021-02-08 RX ORDER — VITAMIN B COMPLEX
1000 TABLET ORAL 2 TIMES DAILY
COMMUNITY
Start: 2021-01-11

## 2021-02-08 RX ORDER — AMLODIPINE BESYLATE 2.5 MG/1
2.5 TABLET ORAL DAILY
Qty: 30 TABLET | Refills: 11 | Status: SHIPPED | OUTPATIENT
Start: 2021-02-08

## 2021-02-08 NOTE — PROGRESS NOTES
Date of Office Visit: 2021  Encounter Provider: OLLIE Owens  Place of Service: [facility] CARDIOLOGY  Patient Name: Richy Kong  :1940    Chief Complaint   Patient presents with   • Nose Bleed   • Coronary Artery Disease   :     HPI: Richy Kong is an 80-year-old male who was sent by Dr. Jiang from [facility] for cardiac surgery evaluation.  He is deemed not a surgical candidate and had an extensive PCI to the left main bifurcating using a mini crushing 3.5 x 15 mm Xience Renita drug-eluting stent from the distal left main into the proximal circumflex followed by 3.5 x 33 mm drug-eluting stent from the mid left main to the mid LAD.  The distal left main perforation was resolved with a kissing balloon angioplasty without significant pericardial effusion or tamponade that was evaluated with echocardiogram.  He did develop some pain and swelling in his left leg that revealed an acute iliac femoral-popliteal DVT.  Vascular surgery was consulted and he underwent a left lower extremity venogram with left iliac vein angioplasty and stent from bilateral popliteal vein approach with Dr. Christensen.  After the procedure he had been placed on a heparin drip and was transitioned over to Eliquis.  He was a little anemic and had to get 2 units of blood and was able to successfully be weaned off oxygen.  EF on his echo was normal at 67%.    Since leaving the hospital he is feeling well.  His prescriptions are filled at a local pharmacy where they fill a weekly pillbox for him.  He got an updated pillbox when he left the hospital.  He has been having some bloody noses.  Mostly some bright red blood when he blows his nose.  He denies any chest pain or shortness of breath.  The swelling in his left leg has going down.    Previous testing and notes have been reviewed by me.   Past Medical History:   Diagnosis Date   • BPH (benign prostatic hyperplasia)    • Cancer  (CMS/HCC)    • CKD (chronic kidney disease) stage 3, GFR 30-59 ml/min (CMS/HCC)    • Dementia (CMS/HCC)    • DVT (deep venous thrombosis) (CMS/HCC)    • Hypertension    • Multiple myeloma (CMS/HCC)     chemo injection  treatment every week   • Multiple myeloma (CMS/HCC)     chemo injection once a week   • PE (pulmonary thromboembolism) (CMS/HCC)        Past Surgical History:   Procedure Laterality Date   • CARDIAC CATHETERIZATION     • CARDIAC CATHETERIZATION N/A 1/29/2021    Procedure: Percutaneous Coronary Intervention to left main;  Surgeon: Alber Muro MD;  Location: [facility] CATH INVASIVE LOCATION;  Service: Cardiovascular;  Laterality: N/A;   • CARDIAC CATHETERIZATION N/A 1/29/2021    Procedure: Stent SADI coronary;  Surgeon: Alber Muro MD;  Location: [facility] CATH INVASIVE LOCATION;  Service: Cardiovascular;  Laterality: N/A;   • IVC FILTER RETRIEVAL      IVC filter currently in place in left leg   • LYTIC THROMBIN THERAPY Left 2/1/2021    Procedure: LFT LEG VENOGRAM, left mechanical thrombectomy;  Surgeon: Mckenzie Christensen Jr., MD;  Location: [facility] OR 18/19;  Service: Vascular;  Laterality: Left;       Social History     Socioeconomic History   • Marital status: Single     Spouse name: Not on file   • Number of children: Not on file   • Years of education: Not on file   • Highest education level: Not on file   Tobacco Use   • Smoking status: Never Smoker   • Smokeless tobacco: Never Used   Substance and Sexual Activity   • Alcohol use: Never     Frequency: Never   • Drug use: Never   • Sexual activity: Defer       Family History   Problem Relation Age of Onset   • No Known Problems Mother    • No Known Problems Father    • No Known Problems Sister    • No Known Problems Brother    • No Known Problems Daughter    • No Known Problems Son    • No Known Problems Maternal Aunt    • No Known Problems Maternal Uncle    • No Known Problems Paternal Aunt    • No Known Problems Paternal  Uncle    • No Known Problems Maternal Grandmother    • No Known Problems Maternal Grandfather    • No Known Problems Paternal Grandmother    • No Known Problems Paternal Grandfather    • No Known Problems Other        Review of Systems   Constitution: Negative for diaphoresis and malaise/fatigue.   HENT: Positive for nosebleeds.    Cardiovascular: Positive for leg swelling (left leg). Negative for chest pain, claudication, dyspnea on exertion, irregular heartbeat, near-syncope, orthopnea, palpitations, paroxysmal nocturnal dyspnea and syncope.   Respiratory: Negative for cough, shortness of breath and sleep disturbances due to breathing.    Musculoskeletal: Negative for falls.   Neurological: Negative for dizziness and weakness.   Psychiatric/Behavioral: Negative for altered mental status and substance abuse.       Allergies   Allergen Reactions   • Pravastatin Unknown - Low Severity         Current Outpatient Medications:   •  acyclovir (ZOVIRAX) 400 MG tablet, Take 400 mg by mouth 2 (Two) Times a Day. Take no more than 5 doses a day., Disp: , Rfl:   •  apixaban (ELIQUIS) 5 MG tablet tablet, Take 1 tablet by mouth Every 12 (Twelve) Hours for 90 days. Indications: DVT/PE (active thrombosis), Disp: 60 tablet, Rfl: 2  •  atorvastatin (LIPITOR) 10 MG tablet, Take 1 tablet by mouth Every Night., Disp: 60 tablet, Rfl: 5  •  cetirizine (zyrTEC) 5 MG tablet, Take 10 mg by mouth Daily., Disp: , Rfl:   •  clopidogrel (PLAVIX) 75 MG tablet, Take 1 tablet by mouth Daily., Disp: 30 tablet, Rfl: 5  •  erythromycin (ROMYCIN) 5 MG/GM ophthalmic ointment, Administer 1 application to both eyes Every 6 (Six) Hours., Disp: , Rfl:   •  isosorbide mononitrate (IMDUR) 60 MG 24 hr tablet, Take 1 tablet by mouth Daily., Disp: 60 tablet, Rfl: 5  •  metoprolol tartrate (LOPRESSOR) 25 MG tablet, Take 1 tablet by mouth 2 (Two) Times a Day for 90 days., Disp: 60 tablet, Rfl: 2  •  tamsulosin (FLOMAX) 0.4 MG capsule 24 hr capsule, Take 1 capsule  "by mouth Daily., Disp: , Rfl:   •  torsemide (DEMADEX) 20 MG tablet, Take 2 tablets by mouth Daily for 90 days., Disp: 60 tablet, Rfl: 2  •  vitamin B-12 (CYANOCOBALAMIN) 500 MCG tablet, Take 500 mcg by mouth Daily., Disp: , Rfl:   •  Vitamin D 25 MCG (1000 UT) tablet, Take 1,000 Units by mouth 2 (Two) Times a Day., Disp: , Rfl:   •  amLODIPine (NORVASC) 2.5 MG tablet, Take 1 tablet by mouth Daily., Disp: 30 tablet, Rfl: 11      Objective:     Vitals:    02/08/21 1402   BP: 148/64   Pulse: 64   Weight: 90.3 kg (199 lb)   Height: 198.1 cm (78\")     Body mass index is 23 kg/m².    PHYSICAL EXAM:    Constitutional:       Appearance: Well-developed.   HENT:      Head: Normocephalic.   Neck:      Musculoskeletal: Normal range of motion.   Cardiovascular:      Comments: Popliteal incisions behind his knee are approximated and scabbed.Right radial cath site with some mild bruising on the forearm procedure site without hematoma  Edema:     Pretibial: 1+ edema of the left pretibial area.     Ankle: 1+ edema of the left ankle.  Neurological:      Mental Status: Alert and oriented to person, place, and time.         Procedures      Assessment:       Diagnosis Plan   1. Acute deep vein thrombosis (DVT) of left iliofemoral vein (CMS/HCC)     2. Coronary artery disease of native artery of native heart with stable angina pectoris (CMS/HCC)     3. Hypercholesterolemia     4. Essential hypertension     5. S/P angioplasty with stent       No orders of the defined types were placed in this encounter.         Plan:       He is doing well post discharge.  His procedure sites are stable.  I discussed with him the need for antiplatelet therapy and Eliquis which he is taking and understands.  I am going to verify his med list with his local pharmacy.  His blood pressure is a little high today I am going to add some amlodipine 2.5 mg which he has been on in the past.    I spent 30minutes preparing, obtaining and reviewing patient's history " and previous testing, seeing the patient and examination, counseling and educating and coordinating care.       Your medication list          Accurate as of February 8, 2021  3:31 PM. If you have any questions, ask your nurse or doctor.            START taking these medications      Instructions Last Dose Given Next Dose Due   amLODIPine 2.5 MG tablet  Commonly known as: NORVASC  Started by: OLLIE Owens      Take 1 tablet by mouth Daily.          CONTINUE taking these medications      Instructions Last Dose Given Next Dose Due   acyclovir 400 MG tablet  Commonly known as: ZOVIRAX      Take 400 mg by mouth 2 (Two) Times a Day. Take no more than 5 doses a day.       apixaban 5 MG tablet tablet  Commonly known as: ELIQUIS      Take 1 tablet by mouth Every 12 (Twelve) Hours for 90 days. Indications: DVT/PE (active thrombosis)       atorvastatin 10 MG tablet  Commonly known as: LIPITOR      Take 1 tablet by mouth Every Night.       cetirizine 5 MG tablet  Commonly known as: zyrTEC      Take 10 mg by mouth Daily.       cholecalciferol 25 MCG (1000 UT) tablet  Commonly known as: VITAMIN D3      Take 1,000 Units by mouth 2 (Two) Times a Day.       clopidogrel 75 MG tablet  Commonly known as: PLAVIX      Take 1 tablet by mouth Daily.       erythromycin 5 MG/GM ophthalmic ointment  Commonly known as: ROMYCIN      Administer 1 application to both eyes Every 6 (Six) Hours.       isosorbide mononitrate 60 MG 24 hr tablet  Commonly known as: IMDUR      Take 1 tablet by mouth Daily.       metoprolol tartrate 25 MG tablet  Commonly known as: LOPRESSOR      Take 1 tablet by mouth 2 (Two) Times a Day for 90 days.       tamsulosin 0.4 MG capsule 24 hr capsule  Commonly known as: FLOMAX      Take 1 capsule by mouth Daily.       torsemide 20 MG tablet  Commonly known as: DEMADEX      Take 2 tablets by mouth Daily for 90 days.       vitamin B-12 500 MCG tablet  Commonly known as: CYANOCOBALAMIN      Take 500 mcg by mouth  Daily.             Where to Get Your Medications      These medications were sent to [facility], [place] - [address] [facility] [address] - [phone]  - [phone]  [address] [address], [facility] [address]    Phone: [phone]   · amLODIPine 2.5 MG tablet           As always, it has been a pleasure to participate in your patient's care.      Sincerely,     Diana LEVIN

## 2021-02-08 NOTE — TELEPHONE ENCOUNTER
Pt daughter called stating he had dried blood crusted in his nose and a small amount of bleeding in tissue when blowing for 1.5 days after leaving hospital. States house was warm and dry but she wasn't sure if this could've been caused by his medications and did not have any signs of bleeding yesterday. Please advise.    DA

## 2021-02-12 ENCOUNTER — READMISSION MANAGEMENT (OUTPATIENT)
Dept: CALL CENTER | Facility: HOSPITAL | Age: 81
End: 2021-02-12

## 2021-02-12 NOTE — OUTREACH NOTE
AMI Week 2 Survey      Responses   [facility] patient discharged from?  [place]   Does the patient have one of the following disease processes/diagnoses(primary or secondary)?  Acute MI (STEMI,NSTEMI)   Week 2 attempt successful?  Yes   Call start time  1342   Call end time  1347   Discharge diagnosis  NSTEMIlower extremity venogram with left iliac vein angioplasty and stent, from bilateral popliteal vein    Meds reviewed with patient/caregiver?  Yes   Is the patient having any side effects they believe may be caused by any medication additions or changes?  No   Does the patient have all prescriptions related to this admission filled (includes statins,anticoagulants,HTN meds,anti-arrhythmia meds)  Yes   Is the patient taking all medications as directed (includes completed medication regime)?  Yes   Does the patient have a primary care provider?   Yes   Does the patient have an appointment with their PCP,cardiologist,or clinic within 7 days of discharge?  No   What is preventing the patient from scheduling follow up appointments within 7 days of discharge?  Unsure of when or with whom   Has the patient kept scheduled appointments due by today?  N/A   Comments  Will have daughter check, she makes his appts   Has home health visited the patient within 72 hours of discharge?  N/A   Did the patient receive a copy of their discharge instructions?  Yes   Nursing interventions  Reviewed instructions with patient   What is the patient's perception of their health status since discharge?  Improving   Is the patient/caregiver able to teach back signs and symptoms of when to call for help immediately:  Irregular or rapid heart rate, Dizziness or lightheadedness, Nausea or vomiting, Sudden sweating or clammy skin, Shortness of breath at any time, Sudden discomfort in arms, back, neck or jaw, Sudden chest discomfort   Is the pateint /caregiver able to teach back the importance of cardiac rehab?  Yes   Nursing  interventions  Provided education on importance of cardiac rehab   Is the patient/caregiver able to teach back lifestyle changes to help prevent MIs  Regular exercise as approved by provider, Heart healthy diet, Maintaining a healthy weight, Reducing stress   Is the patient/caregiver able to teach back ways to prevent a second heart attack:  Take medications, Follow up with MD, Participate in Cardiac Rehab   Is the patient/caregiver able to teach back the hierarchy of who to call/visit for symptoms/problems? PCP, Specialist, Home health nurse, Urgent Care, ED, 911  Yes   Week 2 call completed?  Yes   Wrap up additional comments  Pt is not sure if he has appt made, will ask his daughter. He is feeling good. Is not diabetic, does not smoke or drink alcohol.  Cath site is healed, no issues.  Has all meds.            Nancy Dickens RN

## 2021-02-18 ENCOUNTER — READMISSION MANAGEMENT (OUTPATIENT)
Dept: CALL CENTER | Facility: HOSPITAL | Age: 81
End: 2021-02-18

## 2021-02-18 NOTE — OUTREACH NOTE
AMI Week 3 Survey      Responses   [facility] patient discharged from?  [place]   Does the patient have one of the following disease processes/diagnoses(primary or secondary)?  Acute MI (STEMI,NSTEMI)   Week 3 attempt successful?  Yes   Call start time  1151   Call end time  1152   Meds reviewed with patient/caregiver?  Yes   Is the patient having any side effects they believe may be caused by any medication additions or changes?  No   Does the patient have all prescriptions related to this admission filled (includes statins,anticoagulants,HTN meds,anti-arrhythmia meds)  Yes   Is the patient taking all medications as directed (includes completed medication regime)?  Yes   Does the patient have a primary care provider?   Yes   Does the patient have an appointment with their PCP,cardiologist,or clinic within 7 days of discharge?  Greater than 7 days   What is preventing the patient from scheduling follow up appointments within 7 days of discharge?  Unsure of when or with whom   Has the patient kept scheduled appointments due by today?  N/A   Comments  Will have daughter check, she makes his appts   Has home health visited the patient within 72 hours of discharge?  N/A   Did the patient receive a copy of their discharge instructions?  Yes   Nursing interventions  Reviewed instructions with patient   What is the patient's perception of their health status since discharge?  Improving   Is the patient/caregiver able to teach back signs and symptoms of when to call for help immediately:  Irregular or rapid heart rate, Dizziness or lightheadedness, Nausea or vomiting, Sudden sweating or clammy skin, Shortness of breath at any time, Sudden discomfort in arms, back, neck or jaw, Sudden chest discomfort   Is the pateint /caregiver able to teach back the importance of cardiac rehab?  Yes   Nursing interventions  Provided education on importance of cardiac rehab   Is the patient/caregiver able to teach back lifestyle  changes to help prevent MIs  Regular exercise as approved by provider, Heart healthy diet, Maintaining a healthy weight, Reducing stress   Is the patient/caregiver able to teach back ways to prevent a second heart attack:  Take medications, Follow up with MD, Participate in Cardiac Rehab   Is the patient/caregiver able to teach back the hierarchy of who to call/visit for symptoms/problems? PCP, Specialist, Home health nurse, Urgent Care, ED, 911  Yes   Week 3 call completed?  Yes          Raman Caldwell RN

## 2021-02-26 ENCOUNTER — READMISSION MANAGEMENT (OUTPATIENT)
Dept: CALL CENTER | Facility: HOSPITAL | Age: 81
End: 2021-02-26

## 2021-02-26 NOTE — OUTREACH NOTE
AMI Week 4 Survey      Responses   [facility] patient discharged from?  [place]   Does the patient have one of the following disease processes/diagnoses(primary or secondary)?  Acute MI (STEMI,NSTEMI)   Week 4 attempt successful?  No          Zeny Gomez RN

## 2021-03-09 ENCOUNTER — TELEPHONE (OUTPATIENT)
Dept: CARDIOLOGY | Facility: CLINIC | Age: 81
End: 2021-03-09

## 2021-03-09 DIAGNOSIS — I25.118 CORONARY ARTERY DISEASE OF NATIVE ARTERY OF NATIVE HEART WITH STABLE ANGINA PECTORIS (HCC): Primary | ICD-10-CM

## 2021-03-09 DIAGNOSIS — Z95.820 S/P ANGIOPLASTY WITH STENT: ICD-10-CM

## 2021-03-09 NOTE — TELEPHONE ENCOUNTER
When you get a chance they need a recommendation for a cardiologist closer to them to follow with in the future.  Do you know a good person in the [place] or [place], [place] that we can get them setup with?  Please advise.    Yuridia

## 2021-03-11 NOTE — TELEPHONE ENCOUNTER
I called patient and told her Dr. Gabriele Mendosa in [place], [place]   She states They need a Cardiologist in [place] or [place]  States they have no one to take the to [place], [place]  Otherwise wants to continue Video calls with you.

## 2021-03-17 NOTE — TELEPHONE ENCOUNTER
I called them today and faxed out the referral with his records. They will call them to set up an appt.    Yuridia

## 2021-03-18 ENCOUNTER — TELEMEDICINE (OUTPATIENT)
Dept: CARDIOLOGY | Facility: CLINIC | Age: 81
End: 2021-03-18

## 2021-03-18 VITALS
HEIGHT: 78 IN | HEART RATE: 59 BPM | SYSTOLIC BLOOD PRESSURE: 144 MMHG | BODY MASS INDEX: 22.91 KG/M2 | WEIGHT: 198 LBS | DIASTOLIC BLOOD PRESSURE: 76 MMHG

## 2021-03-18 DIAGNOSIS — I82.422 ACUTE DEEP VEIN THROMBOSIS (DVT) OF LEFT ILIOFEMORAL VEIN (HCC): ICD-10-CM

## 2021-03-18 DIAGNOSIS — Z95.820 S/P ANGIOPLASTY WITH STENT: ICD-10-CM

## 2021-03-18 DIAGNOSIS — I10 ESSENTIAL HYPERTENSION: ICD-10-CM

## 2021-03-18 DIAGNOSIS — I25.118 CORONARY ARTERY DISEASE OF NATIVE ARTERY OF NATIVE HEART WITH STABLE ANGINA PECTORIS (HCC): Primary | ICD-10-CM

## 2021-03-18 DIAGNOSIS — E78.00 HYPERCHOLESTEROLEMIA: ICD-10-CM

## 2021-03-18 PROCEDURE — 99214 OFFICE O/P EST MOD 30 MIN: CPT | Performed by: INTERNAL MEDICINE

## 2021-03-18 NOTE — PROGRESS NOTES
Date of Office Visit: 21  Encounter Provider: Alber Muro MD  Place of Service: [facility] CARDIOLOGY  Patient Name: Richy Kong  :1940    Chief Complaint   Patient presents with   • Follow-up     [facility]   • Coronary Artery Disease   :     HPI:  This patient has consented to a telehealth visit via phone. The visit was scheduled as a video visit to comply with patient safety concerns in accordance with CDC recommendations.  All vitals recorded within this visit are reported by the patient.  I spent 25 minutes in total including but not limited to the 15 minutes spent in direct conversation with this patient.     81-year-old male who was sent by Dr. Jiang from [facility] for cardiac surgery evaluation.  He is deemed not a surgical candidate and had an extensive PCI to the left main bifurcating using a mini crushing 3.5 x 15 mm Xience Renita drug-eluting stent from the distal left main into the proximal circumflex followed by 3.5 x 33 mm drug-eluting stent from the mid left main to the mid LAD.  The distal left main perforation was resolved with a kissing balloon angioplasty without significant pericardial effusion or tamponade that was evaluated with echocardiogram.  He did develop some pain and swelling in his left leg that revealed an acute iliac femoral-popliteal DVT.  Vascular surgery was consulted and he underwent a left lower extremity venogram with left iliac vein angioplasty and stent from bilateral popliteal vein approach with Dr. Christensen.  After the procedure he had been placed on a heparin drip and was transitioned over to Eliquis.  He was a little anemic and had to get 2 units of blood and was able to successfully be weaned off oxygen.  EF on his echo was normal at 67%.    He has been doing well. He denies any chest pain or significant dyspnea. He has not had any bleeding complications, including blood in his stools on the  Eliquis therapy. He has had some mild nose bleeds, which do not sound to be overly significant.          Past Medical History:   Diagnosis Date   • BPH (benign prostatic hyperplasia)    • Cancer (CMS/HCC)    • CKD (chronic kidney disease) stage 3, GFR 30-59 ml/min (CMS/HCC)    • Dementia (CMS/HCC)    • DVT (deep venous thrombosis) (CMS/HCC)    • Hypertension    • Multiple myeloma (CMS/HCC)     chemo injection  treatment every week   • Multiple myeloma (CMS/HCC)     chemo injection once a week   • PE (pulmonary thromboembolism) (CMS/HCC)        Past Surgical History:   Procedure Laterality Date   • CARDIAC CATHETERIZATION     • CARDIAC CATHETERIZATION N/A 1/29/2021    Procedure: Percutaneous Coronary Intervention to left main;  Surgeon: Alber Muro MD;  Location: [facility] INVASIVE LOCATION;  Service: Cardiovascular;  Laterality: N/A;   • CARDIAC CATHETERIZATION N/A 1/29/2021    Procedure: Stent SADI coronary;  Surgeon: Alber Muro MD;  Location: [facility] INVASIVE LOCATION;  Service: Cardiovascular;  Laterality: N/A;   • IVC FILTER RETRIEVAL      IVC filter currently in place in left leg   • LYTIC THROMBIN THERAPY Left 2/1/2021    Procedure: LFT LEG VENOGRAM, left mechanical thrombectomy;  Surgeon: Mckenzie Christensen Jr., MD;  Location: [facility] OR 18/19;  Service: Vascular;  Laterality: Left;       Social History     Socioeconomic History   • Marital status: Single     Spouse name: Not on file   • Number of children: Not on file   • Years of education: Not on file   • Highest education level: Not on file   Tobacco Use   • Smoking status: Never Smoker   • Smokeless tobacco: Never Used   Vaping Use   • Vaping Use: Never used   Substance and Sexual Activity   • Alcohol use: Never   • Drug use: Never   • Sexual activity: Defer       Family History   Problem Relation Age of Onset   • No Known Problems Mother    • No Known Problems Father    • No Known Problems Sister    • No Known  Problems Brother    • No Known Problems Daughter    • No Known Problems Son    • No Known Problems Maternal Aunt    • No Known Problems Maternal Uncle    • No Known Problems Paternal Aunt    • No Known Problems Paternal Uncle    • No Known Problems Maternal Grandmother    • No Known Problems Maternal Grandfather    • No Known Problems Paternal Grandmother    • No Known Problems Paternal Grandfather    • No Known Problems Other        Review of Systems   Constitutional: Negative for diaphoresis and malaise/fatigue.   HENT: Negative for nosebleeds.    Cardiovascular: Positive for leg swelling. Negative for chest pain, claudication, dyspnea on exertion, irregular heartbeat, near-syncope, orthopnea, palpitations, paroxysmal nocturnal dyspnea and syncope.   Respiratory: Negative for cough, shortness of breath and sleep disturbances due to breathing.    Musculoskeletal: Negative for falls.   Neurological: Negative for dizziness and weakness.   Psychiatric/Behavioral: Negative for altered mental status and substance abuse.       Allergies   Allergen Reactions   • Pravastatin Unknown - Low Severity         Current Outpatient Medications:   •  acyclovir (ZOVIRAX) 400 MG tablet, Take 400 mg by mouth 2 (Two) Times a Day. Take no more than 5 doses a day., Disp: , Rfl:   •  amLODIPine (NORVASC) 2.5 MG tablet, Take 1 tablet by mouth Daily., Disp: 30 tablet, Rfl: 11  •  apixaban (ELIQUIS) 5 MG tablet tablet, Take 1 tablet by mouth Every 12 (Twelve) Hours for 90 days. Indications: DVT/PE (active thrombosis), Disp: 60 tablet, Rfl: 2  •  atorvastatin (LIPITOR) 10 MG tablet, Take 1 tablet by mouth Every Night., Disp: 60 tablet, Rfl: 5  •  cetirizine (zyrTEC) 5 MG tablet, Take 10 mg by mouth Daily., Disp: , Rfl:   •  clopidogrel (PLAVIX) 75 MG tablet, Take 1 tablet by mouth Daily., Disp: 30 tablet, Rfl: 5  •  erythromycin (ROMYCIN) 5 MG/GM ophthalmic ointment, Administer 1 application to both eyes Every 6 (Six) Hours., Disp: , Rfl:   •  " isosorbide mononitrate (IMDUR) 60 MG 24 hr tablet, Take 1 tablet by mouth Daily., Disp: 60 tablet, Rfl: 5  •  metoprolol tartrate (LOPRESSOR) 25 MG tablet, Take 1 tablet by mouth 2 (Two) Times a Day for 90 days., Disp: 60 tablet, Rfl: 2  •  tamsulosin (FLOMAX) 0.4 MG capsule 24 hr capsule, Take 1 capsule by mouth Daily., Disp: , Rfl:   •  torsemide (DEMADEX) 20 MG tablet, Take 2 tablets by mouth Daily for 90 days., Disp: 60 tablet, Rfl: 2  •  vitamin B-12 (CYANOCOBALAMIN) 500 MCG tablet, Take 500 mcg by mouth Daily., Disp: , Rfl:   •  Vitamin D 25 MCG (1000 UT) tablet, Take 1,000 Units by mouth 2 (Two) Times a Day., Disp: , Rfl:       Objective:     Vitals:    03/18/21 0818   BP: 144/76   Pulse: 59   Weight: 89.8 kg (198 lb)   Height: 198.1 cm (78\")     Body mass index is 22.88 kg/m².    PHYSICAL EXAM:  No distress   Resting   Normocephalic   No additional exam secondary to telehealth visit    Procedures      Assessment:      No diagnosis found.  No orders of the defined types were placed in this encounter.    Plan:   A very pleasant 81-year-old with a medical history as documented above including complex coronary artery disease requiring left main bifurcation stenting using a DK crush technique. There was a small distal left main perforation secondary to oversizing the kissing balloons that was sealed up with a prolonged balloon inflation. He did well following that and has had no cardiac complications.     He also was noted to have a left lower extremity venogram and an iliac vein angioplasty and stenting along with anticoagulation secondary to DVT and swelling and pain.    1. Coronary artery disease: Prior left main bifurcation stenting. Continue Plavix therapy lifelong. Not on aspirin as he is on Eliquis. I think that is reasonable. No chest pain. Continue beta blockade. Continue atorvastatin therapy low dose. He is tolerating this well.   2. Essential hypertension: Well controlled. Continue current regimen. No " resting bradycardia.   3. DVT: Recommended 3 months of uninterrupted anticoagulant therapy. He should follow up with Vascular and this has been discussed with him.     I will plan on seeing him back as needed. I have recommended cardiology followup with Dr. Jiang  or somebody close. He is aware.            Your medication list          Accurate as of March 18, 2021  8:58 AM. If you have any questions, ask your nurse or doctor.            CONTINUE taking these medications      Instructions Last Dose Given Next Dose Due   acyclovir 400 MG tablet  Commonly known as: ZOVIRAX      Take 400 mg by mouth 2 (Two) Times a Day. Take no more than 5 doses a day.       amLODIPine 2.5 MG tablet  Commonly known as: NORVASC      Take 1 tablet by mouth Daily.       apixaban 5 MG tablet tablet  Commonly known as: ELIQUIS      Take 1 tablet by mouth Every 12 (Twelve) Hours for 90 days. Indications: DVT/PE (active thrombosis)       atorvastatin 10 MG tablet  Commonly known as: LIPITOR      Take 1 tablet by mouth Every Night.       cetirizine 5 MG tablet  Commonly known as: zyrTEC      Take 10 mg by mouth Daily.       cholecalciferol 25 MCG (1000 UT) tablet  Commonly known as: VITAMIN D3      Take 1,000 Units by mouth 2 (Two) Times a Day.       clopidogrel 75 MG tablet  Commonly known as: PLAVIX      Take 1 tablet by mouth Daily.       erythromycin 5 MG/GM ophthalmic ointment  Commonly known as: ROMYCIN      Administer 1 application to both eyes Every 6 (Six) Hours.       isosorbide mononitrate 60 MG 24 hr tablet  Commonly known as: IMDUR      Take 1 tablet by mouth Daily.       metoprolol tartrate 25 MG tablet  Commonly known as: LOPRESSOR      Take 1 tablet by mouth 2 (Two) Times a Day for 90 days.       tamsulosin 0.4 MG capsule 24 hr capsule  Commonly known as: FLOMAX      Take 1 capsule by mouth Daily.       torsemide 20 MG tablet  Commonly known as: DEMADEX      Take 2 tablets by mouth Daily for 90 days.       vitamin B-12 500  MCG tablet  Commonly known as: CYANOCOBALAMIN      Take 500 mcg by mouth Daily.

## 2022-06-20 NOTE — TELEPHONE ENCOUNTER
So in [place] there is [facility] [phone]. I will call Monday and submit a referral to them.    Yuridia   20-Jun-2022 12:15

## 2024-04-18 NOTE — ANESTHESIA PREPROCEDURE EVALUATION
Anesthesia Evaluation     Patient summary reviewed and Nursing notes reviewed                Airway   Mallampati: II  TM distance: >3 FB  Neck ROM: full  Dental    (+) poor dentition, lower dentures, upper dentures and partials    Pulmonary    (+) pulmonary embolism, asthma,  Cardiovascular     ECG reviewed  PT is on anticoagulation therapy  Patient on routine beta blocker  Rhythm: regular  Rate: normal    (+) hypertension, CAD, cardiac stents Drug eluting stent more than 12 months ago angina, DVT, hyperlipidemia,       Neuro/Psych  (+) psychiatric history, dementia,     GI/Hepatic/Renal/Endo    (+)   renal disease CRI,     Musculoskeletal (-) negative ROS    Abdominal    Substance History - negative use     OB/GYN negative ob/gyn ROS         Other      history of cancer                  Anesthesia Plan    ASA 4     MAC   (Dementia  Partial upper and lower dentures have been removed prior to surgery)  intravenous induction     Anesthetic plan, all risks, benefits, and alternatives have been provided, discussed and informed consent has been obtained with: patient.      
2023

## (undated) DEVICE — NC TREK CORONARY DILATATION CATHETER 3.5 MM X 20 MM / RAPID-EXCHANGE: Brand: NC TREK

## (undated) DEVICE — 6F .070 XB LAD 3.5 100CM: Brand: VISTA BRITE TIP

## (undated) DEVICE — TREK CORONARY DILATATION CATHETER 3.50 MM X 15 MM / RAPID-EXCHANGE: Brand: TREK

## (undated) DEVICE — CATH ANGIO TRCN NB BCN .038 5F 65CM RIM

## (undated) DEVICE — CATH TEMPO 5F BER II 100CM: Brand: TEMPO

## (undated) DEVICE — GLV SURG SENSICARE PI MIC PF SZ7.5 LF STRL

## (undated) DEVICE — Device

## (undated) DEVICE — INFLATION DEVICE: Brand: ENCORE™ 26

## (undated) DEVICE — DEV INDEFLATOR P/N 580289

## (undated) DEVICE — PINNACLE INTRODUCER SHEATH: Brand: PINNACLE

## (undated) DEVICE — PINNACLE R/O II INTRODUCER SHEATH WITH RADIOPAQUE MARKER: Brand: PINNACLE

## (undated) DEVICE — PTA BALLOON DILATATION CATHETER: Brand: MUSTANG™

## (undated) DEVICE — DESTINATION PERIPHERAL GUIDING SHEATH: Brand: DESTINATION

## (undated) DEVICE — FLEXOR, CHECK-FLO, INTRODUCER RAABE MODIFICATION: Brand: FLEXOR

## (undated) DEVICE — ADAPT CHECKFLO PERFORMER ASSEMBL

## (undated) DEVICE — STPCK 3WY HP ROT

## (undated) DEVICE — GW EMR FIX EXCHG J STD .035 3MM 260CM

## (undated) DEVICE — NC TREK CORONARY DILATATION CATHETER 4.5 MM X 8 MM / RAPID-EXCHANGE: Brand: NC TREK

## (undated) DEVICE — RUNTHROUGH NS EXTRA FLOPPY PTCA GUIDEWIRE: Brand: RUNTHROUGH

## (undated) DEVICE — CATH GUIDE SOFTVU FLUSH HT STR .035 5F 90CM

## (undated) DEVICE — TREK CORONARY DILATATION CATHETER 3.0 MM X 15 MM / RAPID-EXCHANGE: Brand: TREK

## (undated) DEVICE — RADIFOCUS GLIDEWIRE: Brand: GLIDEWIRE

## (undated) DEVICE — SNAR VASC RETRV ENSNARE STD SYS 7F18X30MM 120CM

## (undated) DEVICE — NAVICROSS SUPPORT CATHETER: Brand: NAVICROSS

## (undated) DEVICE — BND PRESS RADL COMFRT 14IN STRL

## (undated) DEVICE — ATLAS®  PTA BALLOON DILATATION CATHETER 14 MM X 40 MM, 75 CM CATHETER: Brand: ATLAS®

## (undated) DEVICE — PK CATH CARD 40

## (undated) DEVICE — NDL PERC 1PRT THNWALL W/BASEPLT 18G 7CM

## (undated) DEVICE — GLIDESHEATH BASIC HYDROPHILIC COATED INTRODUCER SHEATH: Brand: GLIDESHEATH

## (undated) DEVICE — NC TREK CORONARY DILATATION CATHETER 4.0 MM X 15 MM / RAPID-EXCHANGE: Brand: NC TREK

## (undated) DEVICE — KT MANIFLD CARDIAC

## (undated) DEVICE — NC TREK CORONARY DILATATION CATHETER 4.0 MM X 20 MM / RAPID-EXCHANGE: Brand: NC TREK

## (undated) DEVICE — HI-TORQUE BALANCE MIDDLEWEIGHT GUIDE WIRE .014 STRAIGHT TIP 3.0 CM X 190 CM: Brand: HI-TORQUE BALANCE MIDDLEWEIGHT

## (undated) DEVICE — TREK CORONARY DILATATION CATHETER 3.0 MM X 20 MM / RAPID-EXCHANGE: Brand: TREK

## (undated) DEVICE — PK ANGIO 40

## (undated) DEVICE — Device: Brand: D-STAT® DRY SILVER CLEAR TOPICAL HEMOSTAT

## (undated) DEVICE — SHEATH STEER INTRO TOURGUIDE 7F 9MM 90CM

## (undated) DEVICE — ANGIOGRAPHIC CATHETER: Brand: IMAGER™ II

## (undated) DEVICE — SYRINGE KIT,PACKAGED,,150FT,MK 7(ANGIO-ARTERION, 150ML SYR KIT W/QFT,MC)(60729385): Brand: MEDRAD® MARK 7 ARTERION DISPOSABLE SYRINGE 150 ML WITH QUICK FILL TUBE

## (undated) DEVICE — SOL NACL 0.9PCT 1000ML

## (undated) DEVICE — DESTINATION RENAL GUIDING SHEATH: Brand: DESTINATION

## (undated) DEVICE — SHEATH STEER INTRO TOURGUIDE 7F 9MM 45CM